# Patient Record
Sex: MALE | Race: WHITE | NOT HISPANIC OR LATINO | Employment: UNEMPLOYED | URBAN - METROPOLITAN AREA
[De-identification: names, ages, dates, MRNs, and addresses within clinical notes are randomized per-mention and may not be internally consistent; named-entity substitution may affect disease eponyms.]

---

## 2021-11-16 ENCOUNTER — HOSPITAL ENCOUNTER (INPATIENT)
Facility: CLINIC | Age: 41
LOS: 8 days | Discharge: HOME | DRG: 885 | End: 2021-11-25
Attending: EMERGENCY MEDICINE | Admitting: PSYCHIATRY & NEUROLOGY
Payer: COMMERCIAL

## 2021-11-16 DIAGNOSIS — F10.21 ALCOHOL DEPENDENCE IN REMISSION (H): ICD-10-CM

## 2021-11-16 DIAGNOSIS — R45.851 SUICIDAL IDEATION: ICD-10-CM

## 2021-11-16 DIAGNOSIS — G47.00 INSOMNIA, UNSPECIFIED TYPE: Primary | ICD-10-CM

## 2021-11-16 DIAGNOSIS — F12.90 MARIJUANA USE: ICD-10-CM

## 2021-11-16 DIAGNOSIS — F31.81 BIPOLAR 2 DISORDER (H): ICD-10-CM

## 2021-11-16 DIAGNOSIS — Z20.822 LAB TEST NEGATIVE FOR COVID-19 VIRUS: ICD-10-CM

## 2021-11-16 DIAGNOSIS — Z87.81 HISTORY OF HIP FRACTURE: ICD-10-CM

## 2021-11-16 LAB
AMPHETAMINES UR QL SCN: ABNORMAL
BARBITURATES UR QL: ABNORMAL
BENZODIAZ UR QL: ABNORMAL
CANNABINOIDS UR QL SCN: ABNORMAL
COCAINE UR QL: ABNORMAL
OPIATES UR QL SCN: ABNORMAL
SARS-COV-2 RNA RESP QL NAA+PROBE: NEGATIVE

## 2021-11-16 PROCEDURE — 80307 DRUG TEST PRSMV CHEM ANLYZR: CPT | Performed by: EMERGENCY MEDICINE

## 2021-11-16 PROCEDURE — 99285 EMERGENCY DEPT VISIT HI MDM: CPT | Mod: 25 | Performed by: EMERGENCY MEDICINE

## 2021-11-16 PROCEDURE — 250N000013 HC RX MED GY IP 250 OP 250 PS 637: Performed by: EMERGENCY MEDICINE

## 2021-11-16 PROCEDURE — 250N000011 HC RX IP 250 OP 636: Performed by: EMERGENCY MEDICINE

## 2021-11-16 PROCEDURE — U0003 INFECTIOUS AGENT DETECTION BY NUCLEIC ACID (DNA OR RNA); SEVERE ACUTE RESPIRATORY SYNDROME CORONAVIRUS 2 (SARS-COV-2) (CORONAVIRUS DISEASE [COVID-19]), AMPLIFIED PROBE TECHNIQUE, MAKING USE OF HIGH THROUGHPUT TECHNOLOGIES AS DESCRIBED BY CMS-2020-01-R: HCPCS | Performed by: EMERGENCY MEDICINE

## 2021-11-16 PROCEDURE — 99284 EMERGENCY DEPT VISIT MOD MDM: CPT | Performed by: EMERGENCY MEDICINE

## 2021-11-16 PROCEDURE — 90791 PSYCH DIAGNOSTIC EVALUATION: CPT

## 2021-11-16 PROCEDURE — C9803 HOPD COVID-19 SPEC COLLECT: HCPCS | Performed by: EMERGENCY MEDICINE

## 2021-11-16 RX ORDER — OXYCODONE HYDROCHLORIDE 5 MG/1
5 TABLET ORAL EVERY 6 HOURS PRN
Status: DISCONTINUED | OUTPATIENT
Start: 2021-11-16 | End: 2021-11-17

## 2021-11-16 RX ORDER — LAMOTRIGINE 25 MG/1
25 TABLET ORAL DAILY
Status: ON HOLD | COMMUNITY
End: 2021-11-24

## 2021-11-16 RX ORDER — ACETAMINOPHEN 500 MG
1000 TABLET ORAL ONCE
Status: COMPLETED | OUTPATIENT
Start: 2021-11-16 | End: 2021-11-16

## 2021-11-16 RX ORDER — LAMOTRIGINE 25 MG/1
25 TABLET ORAL DAILY
Status: DISCONTINUED | OUTPATIENT
Start: 2021-11-17 | End: 2021-11-22

## 2021-11-16 RX ORDER — IBUPROFEN 600 MG/1
600 TABLET, FILM COATED ORAL EVERY 6 HOURS PRN
COMMUNITY
Start: 2021-11-06 | End: 2021-11-16

## 2021-11-16 RX ORDER — MULTIVITAMIN,THER AND MINERALS
1 TABLET ORAL DAILY
COMMUNITY
Start: 2021-11-07 | End: 2021-11-16

## 2021-11-16 RX ORDER — LIDOCAINE 4 G/G
1 PATCH TOPICAL DAILY PRN
COMMUNITY
Start: 2021-11-07 | End: 2021-11-16

## 2021-11-16 RX ORDER — HYDROXYZINE PAMOATE 50 MG/1
50 CAPSULE ORAL
COMMUNITY
Start: 2021-11-06 | End: 2021-11-16

## 2021-11-16 RX ORDER — OXYCODONE HYDROCHLORIDE 5 MG/1
5 TABLET ORAL ONCE
Status: COMPLETED | OUTPATIENT
Start: 2021-11-16 | End: 2021-11-16

## 2021-11-16 RX ORDER — OXYCODONE HYDROCHLORIDE 5 MG/1
5 TABLET ORAL EVERY 6 HOURS PRN
Status: ON HOLD | COMMUNITY
Start: 2021-11-14 | End: 2021-11-24

## 2021-11-16 RX ORDER — POLYETHYLENE GLYCOL 3350 17 G/17G
17 POWDER, FOR SOLUTION ORAL DAILY PRN
COMMUNITY
Start: 2021-11-06 | End: 2021-11-16

## 2021-11-16 RX ORDER — ONDANSETRON 4 MG/1
4 TABLET, ORALLY DISINTEGRATING ORAL ONCE
Status: COMPLETED | OUTPATIENT
Start: 2021-11-16 | End: 2021-11-16

## 2021-11-16 RX ORDER — METHOCARBAMOL 500 MG/1
500-1000 TABLET, FILM COATED ORAL EVERY 6 HOURS PRN
COMMUNITY
Start: 2021-11-06 | End: 2021-11-16

## 2021-11-16 RX ORDER — FOLIC ACID 1 MG/1
1 TABLET ORAL DAILY
COMMUNITY
Start: 2021-11-07 | End: 2021-11-16

## 2021-11-16 RX ADMIN — OXYCODONE HYDROCHLORIDE 5 MG: 5 TABLET ORAL at 16:05

## 2021-11-16 RX ADMIN — OXYCODONE HYDROCHLORIDE 5 MG: 5 TABLET ORAL at 22:53

## 2021-11-16 RX ADMIN — ACETAMINOPHEN 1000 MG: 500 TABLET ORAL at 21:03

## 2021-11-16 RX ADMIN — ONDANSETRON 4 MG: 4 TABLET, ORALLY DISINTEGRATING ORAL at 14:23

## 2021-11-16 ASSESSMENT — ENCOUNTER SYMPTOMS
DYSPHORIC MOOD: 1
HALLUCINATIONS: 0
SLEEP DISTURBANCE: 0
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
## 2021-11-16 NOTE — PHARMACY-ADMISSION MEDICATION HISTORY
Admission medication history interview status for the 11/16/2021 admission is complete. See Epic admission navigator for allergy information, pharmacy, prior to admission medications and immunization status.     Medication history interview sources:  patient interview, dispense history     Changes made to PTA medication list (reason)  Added: oxycodone   Deleted: hydroxyzine, lidocaine patch, methocarbamol, folic acid, multivitamin, Miralax, ibuprofen  Changed: none    Additional medication history information:   -Lamotrigine initiated 4 days ago with plan to increase to 50 mg on 11/23/21 then to 100 mg on 12/7/21       Prior to Admission medications    Medication Sig Last Dose Taking? Auth Provider   lamoTRIgine (LAMICTAL) 25 MG tablet Take 25 mg by mouth daily 11/16/2021 at Unknown time Yes Reported, Patient   oxyCODONE (ROXICODONE) 5 MG tablet Take 5 mg by mouth every 6 hours as needed 11/16/2021 at 0600 Yes Unknown, Entered By History       Medication history completed by:   Gricelda Singh, PharmD, BCPPS  Pediatric Clinical Pharmacist
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
## 2021-11-17 PROBLEM — F31.81 BIPOLAR 2 DISORDER (H): Status: ACTIVE | Noted: 2021-11-17

## 2021-11-17 PROBLEM — F12.90 MARIJUANA USE: Status: ACTIVE | Noted: 2021-11-17

## 2021-11-17 PROBLEM — Z87.81 HISTORY OF HIP FRACTURE: Status: ACTIVE | Noted: 2021-11-17

## 2021-11-17 PROBLEM — F10.21 ALCOHOL DEPENDENCE IN REMISSION (H): Status: ACTIVE | Noted: 2021-11-17

## 2021-11-17 PROCEDURE — 124N000002 HC R&B MH UMMC

## 2021-11-17 PROCEDURE — 90853 GROUP PSYCHOTHERAPY: CPT

## 2021-11-17 PROCEDURE — 99222 1ST HOSP IP/OBS MODERATE 55: CPT | Mod: AI | Performed by: PSYCHIATRY & NEUROLOGY

## 2021-11-17 PROCEDURE — 250N000013 HC RX MED GY IP 250 OP 250 PS 637: Performed by: PSYCHIATRY & NEUROLOGY

## 2021-11-17 PROCEDURE — G0177 OPPS/PHP; TRAIN & EDUC SERV: HCPCS

## 2021-11-17 PROCEDURE — 250N000013 HC RX MED GY IP 250 OP 250 PS 637: Performed by: EMERGENCY MEDICINE

## 2021-11-17 RX ORDER — MAGNESIUM HYDROXIDE/ALUMINUM HYDROXICE/SIMETHICONE 120; 1200; 1200 MG/30ML; MG/30ML; MG/30ML
30 SUSPENSION ORAL EVERY 4 HOURS PRN
Status: DISCONTINUED | OUTPATIENT
Start: 2021-11-17 | End: 2021-11-25 | Stop reason: HOSPADM

## 2021-11-17 RX ORDER — AMOXICILLIN 250 MG
1 CAPSULE ORAL 2 TIMES DAILY PRN
Status: DISCONTINUED | OUTPATIENT
Start: 2021-11-17 | End: 2021-11-25 | Stop reason: HOSPADM

## 2021-11-17 RX ORDER — OLANZAPINE 10 MG/2ML
10 INJECTION, POWDER, FOR SOLUTION INTRAMUSCULAR 3 TIMES DAILY PRN
Status: DISCONTINUED | OUTPATIENT
Start: 2021-11-17 | End: 2021-11-25 | Stop reason: HOSPADM

## 2021-11-17 RX ORDER — OLANZAPINE 10 MG/1
10 TABLET ORAL 3 TIMES DAILY PRN
Status: DISCONTINUED | OUTPATIENT
Start: 2021-11-17 | End: 2021-11-25 | Stop reason: HOSPADM

## 2021-11-17 RX ORDER — ACETAMINOPHEN 325 MG/1
650 TABLET ORAL EVERY 4 HOURS PRN
Status: DISCONTINUED | OUTPATIENT
Start: 2021-11-17 | End: 2021-11-25 | Stop reason: HOSPADM

## 2021-11-17 RX ORDER — HYDROXYZINE HYDROCHLORIDE 25 MG/1
25 TABLET, FILM COATED ORAL EVERY 4 HOURS PRN
Status: DISCONTINUED | OUTPATIENT
Start: 2021-11-17 | End: 2021-11-25 | Stop reason: HOSPADM

## 2021-11-17 RX ORDER — NALOXONE HYDROCHLORIDE 0.4 MG/ML
0.4 INJECTION, SOLUTION INTRAMUSCULAR; INTRAVENOUS; SUBCUTANEOUS
Status: DISCONTINUED | OUTPATIENT
Start: 2021-11-17 | End: 2021-11-25 | Stop reason: HOSPADM

## 2021-11-17 RX ORDER — NALOXONE HYDROCHLORIDE 0.4 MG/ML
0.2 INJECTION, SOLUTION INTRAMUSCULAR; INTRAVENOUS; SUBCUTANEOUS
Status: DISCONTINUED | OUTPATIENT
Start: 2021-11-17 | End: 2021-11-25 | Stop reason: HOSPADM

## 2021-11-17 RX ORDER — OXYCODONE HYDROCHLORIDE 5 MG/1
5-10 TABLET ORAL EVERY 6 HOURS PRN
Status: DISCONTINUED | OUTPATIENT
Start: 2021-11-17 | End: 2021-11-22

## 2021-11-17 RX ORDER — OXYCODONE HYDROCHLORIDE 5 MG/1
5 TABLET ORAL EVERY 6 HOURS PRN
Status: CANCELLED | OUTPATIENT
Start: 2021-11-17

## 2021-11-17 RX ORDER — TRAZODONE HYDROCHLORIDE 50 MG/1
50 TABLET, FILM COATED ORAL
Status: DISCONTINUED | OUTPATIENT
Start: 2021-11-17 | End: 2021-11-25 | Stop reason: HOSPADM

## 2021-11-17 RX ORDER — LAMOTRIGINE 25 MG/1
25 TABLET ORAL DAILY
Status: CANCELLED | OUTPATIENT
Start: 2021-11-17

## 2021-11-17 RX ADMIN — TRAZODONE HYDROCHLORIDE 50 MG: 50 TABLET ORAL at 22:01

## 2021-11-17 RX ADMIN — OXYCODONE HYDROCHLORIDE 5 MG: 5 TABLET ORAL at 09:17

## 2021-11-17 RX ADMIN — OXYCODONE HYDROCHLORIDE 10 MG: 5 TABLET ORAL at 16:08

## 2021-11-17 RX ADMIN — FLUOXETINE 20 MG: 20 CAPSULE ORAL at 12:10

## 2021-11-17 RX ADMIN — ACETAMINOPHEN 650 MG: 325 TABLET, FILM COATED ORAL at 17:34

## 2021-11-17 RX ADMIN — ACETAMINOPHEN 650 MG: 325 TABLET, FILM COATED ORAL at 10:03

## 2021-11-17 RX ADMIN — OXYCODONE HYDROCHLORIDE 10 MG: 5 TABLET ORAL at 22:01

## 2021-11-17 RX ADMIN — LAMOTRIGINE 25 MG: 25 TABLET ORAL at 09:17

## 2021-11-17 ASSESSMENT — ACTIVITIES OF DAILY LIVING (ADL)
DRESS: INDEPENDENT
ORAL_HYGIENE: INDEPENDENT
HYGIENE/GROOMING: INDEPENDENT
LAUNDRY: WITH SUPERVISION
LAUNDRY: WITH SUPERVISION
ORAL_HYGIENE: INDEPENDENT
HYGIENE/GROOMING: INDEPENDENT
HYGIENE/GROOMING: INDEPENDENT
ORAL_HYGIENE: INDEPENDENT

## 2021-11-17 ASSESSMENT — MIFFLIN-ST. JEOR: SCORE: 2063.38
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
## 2021-11-17 NOTE — ED NOTES
ED to Behavioral Floor Handoff    SITUATION  Fran Lowery is a 41 year old male who speaks English and lives in a treatment center. The patient arrived in the ED by private car from treatment center with a complaint of Suicidal (SI with plan to hang self)  .The patient's current symptoms started/worsened 1 and during this time the symptoms have remained the same.   In the ED, pt was diagnosed with   Final diagnoses:   Bipolar 2 disorder (H)   Suicidal ideation   Alcohol dependence in remission (H)        Initial vitals were: BP: 132/87  Pulse: 87  Temp: 97.3  F (36.3  C)  Resp: 16  Weight: 110.2 kg (243 lb)  SpO2: 98 %   --------  Is the patient diabetic? No   If yes, last blood glucose? --     If yes, was this treated in the ED? --  --------  Is the patient inebriated (ETOH) No or Impaired on other substances? No  MSSA done? N/A  Last MSSA score: --    Were withdrawal symptoms treated? N/A  Does the patient have a seizure history? No. If yes, date of most recent seizure--  --------  Is the patient patient experiencing suicidal ideation? pt has thoughts of SI that comes and goes    Homicidal ideation? denies current or recent homicidal ideation or behaviors.    Self-injurious behavior/urges? denies current or recent self injurious behavior or ideation.  ------  Was pt aggressive in the ED No  Was a code called No  Is the pt now cooperative? Yes  -------  Meds given in ED:   Medications   oxyCODONE (ROXICODONE) tablet 5 mg (5 mg Oral Given 11/16/21 1605)   ondansetron (ZOFRAN-ODT) ODT tab 4 mg (4 mg Oral Given 11/16/21 1423)   acetaminophen (TYLENOL) tablet 1,000 mg (1,000 mg Oral Given 11/16/21 2103)      Family present during ED course? No  Family currently present? No    BACKGROUND  Does the patient have a cognitive impairment or developmental disability? No  Allergies: No Known Allergies.   Social demographics are   Social History     Socioeconomic History     Marital status:      Spouse name: None      Number of children: None     Years of education: None     Highest education level: None   Occupational History     None   Tobacco Use     Smoking status: Never Smoker     Smokeless tobacco: Never Used   Substance and Sexual Activity     Alcohol use: Yes     Comment: binge     Drug use: Yes     Types: Marijuana     Sexual activity: Not Currently   Other Topics Concern     None   Social History Narrative     None     Social Determinants of Health     Financial Resource Strain: Not on file   Food Insecurity: Not on file   Transportation Needs: Not on file   Physical Activity: Not on file   Stress: Not on file   Social Connections: Not on file   Intimate Partner Violence: Not on file   Housing Stability: Not on file        ASSESSMENT  Labs results   Labs Ordered and Resulted from Time of ED Arrival to Time of ED Departure   DRUG ABUSE SCREEN 1 URINE (ED) - Abnormal       Result Value    Amphetamines Urine Screen Negative      Barbiturates Urine Screen Negative      Benzodiazepines Urine Screen Negative      Cannabinoids Urine Screen Positive (*)     Cocaine Urine Screen Negative      Opiates Urine Screen Negative     COVID-19 VIRUS (CORONAVIRUS) BY PCR - Normal    SARS CoV2 PCR Negative        Imaging Studies: No results found for this or any previous visit (from the past 24 hour(s)).   Most recent vital signs /87   Pulse 87   Temp 97.3  F (36.3  C) (Oral)   Resp 16   Wt 110.2 kg (243 lb)   SpO2 98%    Abnormal labs/tests/findings requiring intervention:---   Pain control: fair  Nausea control: pt had none    RECOMMENDATION  Are any infection precautions needed (MRSA, VRE, etc.)? No If yes, what infection? --  ---  Does the patient have mobility issues? Pt walks with crutches. If yes, what device does the pt use? ---  ---  Is patient on 72 hour hold or commitment? No If on 72 hour hold, have hold and rights been given to patient? N/A  Are admitting orders written if after 10 p.m. ?N/A  Tasks needing to be  completed:---     Rae Trinh, RN   asc--    [phone] [place] [facility]   [phone] [facility]
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
## 2021-11-17 NOTE — ED NOTES
Pt has calm, pain being managed with Tylenol and Oxycodone. Pt has been using crutches in [facility] and is amenable in using either WC or walker when he is in the unit.  Pt able to contract to safety

## 2021-11-17 NOTE — PROGRESS NOTES
11/17/21 0339   Patient Belongings   Did you bring any home meds/supplements to the hospital?  No   Patient Belongings locker   Patient Belongings Remaining with Patient medical/assistive equipment   Patient Belongings Put in Hospital Secure Location (Security or Locker, etc.) clothing;crutches;MP3 Player;shoes   Belongings Search Yes   Clothing Search Yes   Second Staff Braden LEROY and Cody LEROY RN did the intake search   Comment Anat RICHARD did the belongings search       On patient: hospital walker    Locker: crutches, backpack, travel mug, dental hygiene, hygiene wipes, deck of cards, green folder with stapled paperwork, 2x pants, 2x long sleeve shirts, long sleeve thermal, 3x underwear, pair of shoes, comb, 2x socks, jacket, black zip-up hoodie with strings, [place] ID, stocking hat, and an MP3 player w/2x headphones                A               Admission:  I am responsible for any personal items that are not sent to the safe or pharmacy.  [facility] is not responsible for loss, theft or damage of any property in my possession.    Signature:  _________________________________ Date: _______  Time: _____                                              Staff Signature:  ____________________________ Date: ________  Time: _____      2nd Staff person, if patient is unable/unwilling to sign:    Signature: ________________________________ Date: ________  Time: _____     Discharge:  [facility] has returned all of my personal belongings:    Signature: _________________________________ Date: ________  Time: _____                                          Staff Signature:  ____________________________ Date: ________  Time: _____
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
## 2021-11-17 NOTE — PLAN OF CARE
Pt has a full range affect with a calm mood attending groups. Pt rates anxiety at 0/10 and depression 0/10. Pt received oxycodone for right hip pain with some relief. Pt reports no SI/HI and contracts for safety. Pt denies any hallucinations and not noted responding to any internal stimuli. Pt was medication compliant. No reported or observed medication side effects. Pt was visible on unit and engaged. Continue current POC.

## 2021-11-17 NOTE — PROGRESS NOTES
11/17/21 1759   General Information   Date Initially Attended OT 11/17/21     OT Groups Attended: Clinic     Mood/Affect/General Presentation: Calm/pleasant, engaged, congruent affect.     Patient Response: Pt actively participated in occupational therapy clinic with 5 patients for 45 minutes. Pt was able to ask for assistance as needed, and independently initiate a familiar, single-step, creative expression task with initial assistance for task selection and setup. Pt demonstrated good focus (30 minutes without interruption), planning, and attention to detail during task completion. Pt appeared comfortable interacting with peers and writer, and socialized pleasantly on occasion during his time in clinic.    Plan: More information needed to complete OT Initial Assessment; OT staff will meet with pt to review the role of occupational therapy and explain the value of having them involved in their treatment plan including options to meet current needs/self-identified goals. As group attendance is established, Pt will be given self-assessment to inform OT initial assessment.

## 2021-11-18 LAB
ALBUMIN SERPL-MCNC: 3.5 G/DL (ref 3.4–5)
ALP SERPL-CCNC: 41 U/L (ref 40–150)
ALT SERPL W P-5'-P-CCNC: 39 U/L (ref 0–70)
ANION GAP SERPL CALCULATED.3IONS-SCNC: 4 MMOL/L (ref 3–14)
AST SERPL W P-5'-P-CCNC: 16 U/L (ref 0–45)
BASOPHILS # BLD AUTO: 0 10E3/UL (ref 0–0.2)
BASOPHILS NFR BLD AUTO: 1 %
BILIRUB SERPL-MCNC: 0.7 MG/DL (ref 0.2–1.3)
BUN SERPL-MCNC: 12 MG/DL (ref 7–30)
CALCIUM SERPL-MCNC: 8.8 MG/DL (ref 8.5–10.1)
CHLORIDE BLD-SCNC: 106 MMOL/L (ref 94–109)
CHOLEST SERPL-MCNC: 173 MG/DL
CO2 SERPL-SCNC: 29 MMOL/L (ref 20–32)
CREAT SERPL-MCNC: 1.02 MG/DL (ref 0.66–1.25)
EOSINOPHIL # BLD AUTO: 0.1 10E3/UL (ref 0–0.7)
EOSINOPHIL NFR BLD AUTO: 2 %
ERYTHROCYTE [DISTWIDTH] IN BLOOD BY AUTOMATED COUNT: 12 % (ref 10–15)
GFR SERPL CREATININE-BSD FRML MDRD: >90 ML/MIN/1.73M2
GLUCOSE BLD-MCNC: 92 MG/DL (ref 70–99)
HCT VFR BLD AUTO: 44.4 % (ref 40–53)
HDLC SERPL-MCNC: 39 MG/DL
HGB BLD-MCNC: 15.3 G/DL (ref 13.3–17.7)
IMM GRANULOCYTES # BLD: 0 10E3/UL
IMM GRANULOCYTES NFR BLD: 0 %
LDLC SERPL CALC-MCNC: 117 MG/DL
LYMPHOCYTES # BLD AUTO: 1.9 10E3/UL (ref 0.8–5.3)
LYMPHOCYTES NFR BLD AUTO: 38 %
MCH RBC QN AUTO: 31.3 PG (ref 26.5–33)
MCHC RBC AUTO-ENTMCNC: 34.5 G/DL (ref 31.5–36.5)
MCV RBC AUTO: 91 FL (ref 78–100)
MONOCYTES # BLD AUTO: 0.4 10E3/UL (ref 0–1.3)
MONOCYTES NFR BLD AUTO: 7 %
NEUTROPHILS # BLD AUTO: 2.7 10E3/UL (ref 1.6–8.3)
NEUTROPHILS NFR BLD AUTO: 52 %
NONHDLC SERPL-MCNC: 134 MG/DL
NRBC # BLD AUTO: 0 10E3/UL
NRBC BLD AUTO-RTO: 0 /100
PLATELET # BLD AUTO: 150 10E3/UL (ref 150–450)
POTASSIUM BLD-SCNC: 4 MMOL/L (ref 3.4–5.3)
PROT SERPL-MCNC: 6.6 G/DL (ref 6.8–8.8)
RBC # BLD AUTO: 4.89 10E6/UL (ref 4.4–5.9)
SODIUM SERPL-SCNC: 139 MMOL/L (ref 133–144)
TRIGL SERPL-MCNC: 85 MG/DL
TSH SERPL DL<=0.005 MIU/L-ACNC: 1.16 MU/L (ref 0.4–4)
WBC # BLD AUTO: 5.1 10E3/UL (ref 4–11)

## 2021-11-18 PROCEDURE — 250N000013 HC RX MED GY IP 250 OP 250 PS 637: Performed by: PSYCHIATRY & NEUROLOGY

## 2021-11-18 PROCEDURE — 99232 SBSQ HOSP IP/OBS MODERATE 35: CPT | Performed by: PSYCHIATRY & NEUROLOGY

## 2021-11-18 PROCEDURE — 124N000002 HC R&B MH UMMC

## 2021-11-18 PROCEDURE — 82465 ASSAY BLD/SERUM CHOLESTEROL: CPT | Performed by: PSYCHIATRY & NEUROLOGY

## 2021-11-18 PROCEDURE — 84443 ASSAY THYROID STIM HORMONE: CPT | Performed by: PSYCHIATRY & NEUROLOGY

## 2021-11-18 PROCEDURE — G0177 OPPS/PHP; TRAIN & EDUC SERV: HCPCS

## 2021-11-18 PROCEDURE — 82040 ASSAY OF SERUM ALBUMIN: CPT | Performed by: PSYCHIATRY & NEUROLOGY

## 2021-11-18 PROCEDURE — 85004 AUTOMATED DIFF WBC COUNT: CPT | Performed by: PSYCHIATRY & NEUROLOGY

## 2021-11-18 PROCEDURE — 82247 BILIRUBIN TOTAL: CPT | Performed by: PSYCHIATRY & NEUROLOGY

## 2021-11-18 PROCEDURE — 36415 COLL VENOUS BLD VENIPUNCTURE: CPT | Performed by: PSYCHIATRY & NEUROLOGY

## 2021-11-18 PROCEDURE — 250N000013 HC RX MED GY IP 250 OP 250 PS 637: Performed by: EMERGENCY MEDICINE

## 2021-11-18 RX ADMIN — FLUOXETINE 20 MG: 20 CAPSULE ORAL at 07:51

## 2021-11-18 RX ADMIN — OXYCODONE HYDROCHLORIDE 10 MG: 5 TABLET ORAL at 20:35

## 2021-11-18 RX ADMIN — LAMOTRIGINE 25 MG: 25 TABLET ORAL at 07:51

## 2021-11-18 RX ADMIN — OXYCODONE HYDROCHLORIDE 10 MG: 5 TABLET ORAL at 14:32

## 2021-11-18 RX ADMIN — TRAZODONE HYDROCHLORIDE 50 MG: 50 TABLET ORAL at 21:34

## 2021-11-18 RX ADMIN — ACETAMINOPHEN 650 MG: 325 TABLET, FILM COATED ORAL at 10:08

## 2021-11-18 RX ADMIN — OXYCODONE HYDROCHLORIDE 10 MG: 5 TABLET ORAL at 07:51

## 2021-11-18 ASSESSMENT — ACTIVITIES OF DAILY LIVING (ADL)
DRESS: SCRUBS (BEHAVIORAL HEALTH)
ORAL_HYGIENE: INDEPENDENT
HYGIENE/GROOMING: INDEPENDENT
DRESS: INDEPENDENT
HYGIENE/GROOMING: INDEPENDENT
LAUNDRY: WITH SUPERVISION
ORAL_HYGIENE: INDEPENDENT
LAUNDRY: WITH SUPERVISION

## 2021-11-18 ASSESSMENT — MIFFLIN-ST. JEOR: SCORE: 2040.7
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
## 2021-11-18 NOTE — PLAN OF CARE
Intervention: Pt attended 2 of 3 OT groups: OT Clinic groups with 4 peers. (NO CHARGE GROUP 2)    Patient Response: Pt elected to work on multiple coloring project for duration of attendance. Pt was social with peers and writer throughout duration of game, engaging in conversation about music and music preferences. Pt left second clinic group early to speak with  and returned briefly to group before stating they were done and needed a break.     Duration of attendance: Other: Group 1 - 45 min, Group 2 - 25 min.     Mood/Affect: Pleasant      Plan: Patient encouraged to maintain attendance for continued ongoing support in working towards occupational therapy goals to support overall treatment/care. More information is needed to complete initial OT assessment. As group attendance is established, OT will provide pt with self-assessment form to inform treatment planning/goal setting, and provide explanation of the benefit of participation in occupational therapy services to overall treatment/care during hospitalization.

## 2021-11-18 NOTE — PROGRESS NOTES
"[facility] [place], [place]   Psychiatric Progress Note        Interim History:   The patient's care was discussed with the treatment team during the daily team meeting and/or staff's chart notes were reviewed.  Staff report patient appears to be somewhat more social, talks to peers and goes to groups. He is limping, uses walker to ambulate. Asks for pain med. Was reported to sleep for 6.75 hours.     Met with patient: he was seen in presence of 2 nursing students. Stated that his mood was still so-so, admitted to having Suicidal ideation off and on, though, overall, reports that he has been doing better. Talked to . Was compliant with meds, denied having any side effects. Said that his limping would not prevent him from going to another CD treatment program which would involve using stairs: \"I use crutches, I can manage stairs\". Made it very clear that he would not return to [address] [facility]: \"people use drugs, steal from each other\". He had no further questions or concerns. Patient's lab work from 11/18/2021 was reviewed.          Medications:       FLUoxetine  20 mg Oral Daily     lamoTRIgine  25 mg Oral Daily          Allergies:   No Known Allergies       Labs:     Recent Results (from the past 24 hour(s))   Comprehensive metabolic panel    Collection Time: 11/18/21  7:44 AM   Result Value Ref Range    Sodium 139 133 - 144 mmol/L    Potassium 4.0 3.4 - 5.3 mmol/L    Chloride 106 94 - 109 mmol/L    Carbon Dioxide (CO2) 29 20 - 32 mmol/L    Anion Gap 4 3 - 14 mmol/L    Urea Nitrogen 12 7 - 30 mg/dL    Creatinine 1.02 0.66 - 1.25 mg/dL    Calcium 8.8 8.5 - 10.1 mg/dL    Glucose 92 70 - 99 mg/dL    Alkaline Phosphatase 41 40 - 150 U/L    AST 16 0 - 45 U/L    ALT 39 0 - 70 U/L    Protein Total 6.6 (L) 6.8 - 8.8 g/dL    Albumin 3.5 3.4 - 5.0 g/dL    Bilirubin Total 0.7 0.2 - 1.3 mg/dL    GFR Estimate >90 >60 mL/min/1.73m2   TSH with free T4 reflex and/or T3 as indicated    Collection Time: " "11/18/21  7:44 AM   Result Value Ref Range    TSH 1.16 0.40 - 4.00 mU/L   Lipid panel    Collection Time: 11/18/21  7:44 AM   Result Value Ref Range    Cholesterol 173 <200 mg/dL    Triglycerides 85 <150 mg/dL    Direct Measure HDL 39 (L) >=40 mg/dL    LDL Cholesterol Calculated 117 (H) <=100 mg/dL    Non HDL Cholesterol 134 (H) <130 mg/dL   CBC with platelets and differential    Collection Time: 11/18/21  7:44 AM   Result Value Ref Range    WBC Count 5.1 4.0 - 11.0 10e3/uL    RBC Count 4.89 4.40 - 5.90 10e6/uL    Hemoglobin 15.3 13.3 - 17.7 g/dL    Hematocrit 44.4 40.0 - 53.0 %    MCV 91 78 - 100 fL    MCH 31.3 26.5 - 33.0 pg    MCHC 34.5 31.5 - 36.5 g/dL    RDW 12.0 10.0 - 15.0 %    Platelet Count 150 150 - 450 10e3/uL    % Neutrophils 52 %    % Lymphocytes 38 %    % Monocytes 7 %    % Eosinophils 2 %    % Basophils 1 %    % Immature Granulocytes 0 %    NRBCs per 100 WBC 0 <1 /100    Absolute Neutrophils 2.7 1.6 - 8.3 10e3/uL    Absolute Lymphocytes 1.9 0.8 - 5.3 10e3/uL    Absolute Monocytes 0.4 0.0 - 1.3 10e3/uL    Absolute Eosinophils 0.1 0.0 - 0.7 10e3/uL    Absolute Basophils 0.0 0.0 - 0.2 10e3/uL    Absolute Immature Granulocytes 0.0 <=0.0 10e3/uL    Absolute NRBCs 0.0 10e3/uL          Psychiatric Examination:     /69   Pulse 53   Temp 97.8  F (36.6  C) (Tympanic)   Resp 16   Ht 1.88 m (6' 2\")   Wt 106.6 kg (235 lb)   SpO2 96%   BMI 30.17 kg/m    Weight is 235 lbs 0 oz  Body mass index is 30.17 kg/m .    Orthostatic Vitals  Report      Most Recent      Sitting Orthostatic /84 11/18 0828    Sitting Orthostatic Pulse (bpm) 65 11/18 0828    Standing Orthostatic /85 11/18 0828    Standing Orthostatic Pulse (bpm) 64 11/18 4550          Appearance: awake, alert, dressed in hospital scrubs and appeared as age stated  Attitude:  cooperative  Eye Contact:  fair  Mood:  sad  and better  Affect:  constricted mobility  Speech:  clear, coherent  Psychomotor Behavior:  no evidence of tardive " dyskinesia, dystonia, or tics  Throught Process:  logical and linear  Associations:  no loose associations  Thought Content:  passive suicidal ideation present  Insight:  partial  Judgement:  fair  Oriented to:  time, person, and place  Attention Span and Concentration:  intact  Recent and Remote Memory:  fair    Clinical Global Impressions    First: 6/4 11/18/2021      Most recent:            Precautions:     Behavioral Orders   Procedures     Code 1 - Restrict to Unit     Discontinue 1:1 attendant for suicide risk     Order Specific Question:   I have performed an in person assessment of the patient     Answer:   Based on this assessment the patient no longer requires a one on one attendant at this point in time.     Fall precautions     Routine Programming     As clinically indicated     Status 15     Every 15 minutes.     Suicide precautions     Patients on Suicide Precautions should have a Combination Diet ordered that includes a Diet selection(s) AND a Behavioral Tray selection for Safe Tray - with utensils, or Safe Tray - NO utensils            DIagnoses:     The patient's symptoms are more consistent with a major depressive disorder, recurrent, moderate severity than with bipolar affective disorder.  Alcohol use disorder.  Cannabis use disorder. Antisocial personality traits versus disorder, likely.         Plan:     No medication changes today. Will continue to provide support and structure and work on placement to another CD treatment program. Will order CD eval.

## 2021-11-18 NOTE — PROGRESS NOTES
SPIRITUAL HEALTH SERVICES  SPIRITUAL ASSESSMENT Progress Note  [facility] ([facility] 10N     REFERRAL SOURCE: Pt request     Pt was receptive to meeting with me, and talked about his yumiko journey as well as his strong connection to God.  Pt said that he became born again only a few years ago, and was connected to a community but has to find a new one now that he is getting .  We explored what his new life might look like and what recovery looks like to him, as well as how he wants to continue to work on his mental health before helping others.  I offered prayer at the end of our visit which patient appreciated.    PLAN: [facility] will remain available     Anuradha Abdullahilain  Pager: [phone]

## 2021-11-18 NOTE — PLAN OF CARE
Assessment/Intervention/Current Symtoms and Care Coordination  The patient has been compliant with all care and wants to return to a residential chemical dependency treatment.  He will not return to [facility] and cannot return to [facility] or [facility].  Dr. Jeffrey to put in order for another CD assessment or possible update.  She may only need an update. States last one was completed at [facility] 4-5 days ago.  Patient is on crutches and reports he can get up stairs.    Discharge Plan or Goal  Patient would like to enter residential chemical dependency program.    Barriers to Discharge   Patient needs medication management and stabilization of mood.Patient also needs new or updated CD assessment.    Referral Status  Not as yet.    Legal Status  Voluntary

## 2021-11-18 NOTE — PROGRESS NOTES
"CLINICAL NUTRITION SERVICES - BRIEF NOTE    REASON FOR BRIEF ASSESSMENT  Fran Lowery is a/an 41 year old male briefly assessed by the dietitian for Positive Admission Nutrition Risk Screen (unknown weight loss, decreased appetite)    FINDINGS   Chart review only. Pt admits to facility in the setting of SI, with other noted history of bipolar 2 disorder, alcohol dependence, marijuana abuse, and r/o personality disorder. MAR reviewed. Labs reviewed. Pt is on a regular diet taking meals adequately per nursing notes.     Ht Readings from Last 1 Encounters:   11/17/21 1.88 m (6' 2\")     11/18/21 0828 106.6 kg (235 lb)   11/17/21 0254 108.9 kg (240 lb)   11/16/21 1125 110.2 kg (243 lb)      106.6 kg (235 lb) 11/04/2021 - per CE     BMI Readings from Last 1 Encounters:   11/18/21 30.17 kg/m      Weight assessment: Current weight appears stable from 2 weeks ago, BMI classified as obesity Grade I.     INTERVENTIONS  Implementation  None - risk screen inaccurate/no nutritional concern identified upon chart review, and as long as pt taking meals adequately, RD has no recommendations or suggestions for intervention at this time.     Monitoring/Evaluation  No nutrition follow-up warranted at this time. RD to sign off. Please consult if further needs arise.     Chiquita Hayes RD, CNSC, LD  [facility] RD pager: [phone]        "
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
## 2021-11-18 NOTE — PLAN OF CARE
Patient has been calm and cooperative this shift. He presents with a blunted affect. He his withdrawn to self. He has been isolative to his room but will sometimes hang out in the lounge for a few minutes. Patient ate dinner adequately. Patient denies all mental health symptoms. He has received medication for pain three times during the shift. Vitals are WNL.

## 2021-11-18 NOTE — PLAN OF CARE
Patient was recorded asleep for 6.75hrs. He had no medical or behavioral concerns and remains on 15min checks. RN will continue to monitor.

## 2021-11-19 PROCEDURE — 99231 SBSQ HOSP IP/OBS SF/LOW 25: CPT | Performed by: PSYCHIATRY & NEUROLOGY

## 2021-11-19 PROCEDURE — H0001 ALCOHOL AND/OR DRUG ASSESS: HCPCS

## 2021-11-19 PROCEDURE — 250N000013 HC RX MED GY IP 250 OP 250 PS 637: Performed by: PSYCHIATRY & NEUROLOGY

## 2021-11-19 PROCEDURE — G0177 OPPS/PHP; TRAIN & EDUC SERV: HCPCS

## 2021-11-19 PROCEDURE — 124N000002 HC R&B MH UMMC

## 2021-11-19 PROCEDURE — 90853 GROUP PSYCHOTHERAPY: CPT

## 2021-11-19 PROCEDURE — 250N000013 HC RX MED GY IP 250 OP 250 PS 637: Performed by: EMERGENCY MEDICINE

## 2021-11-19 RX ADMIN — OXYCODONE HYDROCHLORIDE 10 MG: 5 TABLET ORAL at 20:48

## 2021-11-19 RX ADMIN — ACETAMINOPHEN 650 MG: 325 TABLET, FILM COATED ORAL at 15:45

## 2021-11-19 RX ADMIN — LAMOTRIGINE 25 MG: 25 TABLET ORAL at 07:50

## 2021-11-19 RX ADMIN — FLUOXETINE 20 MG: 20 CAPSULE ORAL at 07:50

## 2021-11-19 RX ADMIN — OXYCODONE HYDROCHLORIDE 10 MG: 5 TABLET ORAL at 14:42

## 2021-11-19 RX ADMIN — TRAZODONE HYDROCHLORIDE 50 MG: 50 TABLET ORAL at 21:54

## 2021-11-19 RX ADMIN — OXYCODONE HYDROCHLORIDE 10 MG: 5 TABLET ORAL at 07:50

## 2021-11-19 RX ADMIN — ACETAMINOPHEN 650 MG: 325 TABLET, FILM COATED ORAL at 10:00

## 2021-11-19 ASSESSMENT — ACTIVITIES OF DAILY LIVING (ADL)
LAUNDRY: WITH SUPERVISION
HYGIENE/GROOMING: INDEPENDENT
HYGIENE/GROOMING: INDEPENDENT
ORAL_HYGIENE: INDEPENDENT
DRESS: SCRUBS (BEHAVIORAL HEALTH)
DRESS: SCRUBS (BEHAVIORAL HEALTH);INDEPENDENT
ORAL_HYGIENE: INDEPENDENT

## 2021-11-19 NOTE — PROGRESS NOTES
"[facility], [place]   Psychiatric Progress Note        Interim History:   The patient's care was discussed with the treatment team during the daily team meeting and/or staff's chart notes were reviewed.  Staff report patient appears to be more social, talks to peers and goes to groups. He is limping, uses walker to ambulate. Periodically asks for pain med. Was reported to sleep for 7 hours. Met with CD counselor today, was referred to the following programs:    [facility] Team for Referrals  Phone: [phone]  Fax: [phone]     [facility] - [phone]  Fax - [phone]     [facility]  For Intake Call - [phone]  email - [email or web address]    Met with patient: he was in a better mood, though, reported still having Suicidal ideation as recently as yesterday, but said that today he felt better and was not suicidal. Told me about his visit with CD counselor and confirmed that he was looking forward to going to CD treatment. Agreed with my suggestion to increase his Fluoxetine dose and requested to see an orthopedic service. He had no further questions or concerns.         Medications:       [START ON 11/20/2021] FLUoxetine  30 mg Oral Daily     lamoTRIgine  25 mg Oral Daily          Allergies:   No Known Allergies       Labs:     No results found for this or any previous visit (from the past 24 hour(s)).       Psychiatric Examination:     /81 (BP Location: Left arm)   Pulse 52   Temp 98.1  F (36.7  C) (Oral)   Resp 16   Ht 1.88 m (6' 2\")   Wt 106.6 kg (235 lb)   SpO2 97%   BMI 30.17 kg/m    Weight is 235 lbs 0 oz  Body mass index is 30.17 kg/m .    Orthostatic Vitals  Report      Most Recent      Sitting Orthostatic /81 11/19 0807    Sitting Orthostatic Pulse (bpm) 52 11/19 0807    Standing Orthostatic /74 11/19 0807    Standing Orthostatic Pulse (bpm) 67 11/19 0807         Appearance: awake, alert, dressed in " hospital scrubs and appeared as age stated  Attitude: cooperative  Eye Contact:  fair  Mood: better  Affect: full range  Speech:  clear, coherent  Psychomotor Behavior:  no evidence of tardive dyskinesia, dystonia, or tics  Throught Process:  logical and linear  Associations:  no loose associations  Thought Content:  passive suicidal ideation presents off and on  Insight:  partial  Judgement:  fair  Oriented to:  time, person, and place  Attention Span and Concentration:  intact  Recent and Remote Memory:  fair    Clinical Global Impressions    First: 6/4 11/18/2021      Most recent:            Precautions:     Behavioral Orders   Procedures     Code 1 - Restrict to Unit     Discontinue 1:1 attendant for suicide risk     Order Specific Question:   I have performed an in person assessment of the patient     Answer:   Based on this assessment the patient no longer requires a one on one attendant at this point in time.     Fall precautions     Routine Programming     As clinically indicated     Status 15     Every 15 minutes.     Suicide precautions     Patients on Suicide Precautions should have a Combination Diet ordered that includes a Diet selection(s) AND a Behavioral Tray selection for Safe Tray - with utensils, or Safe Tray - NO utensils            DIagnoses:     The patient's symptoms are more consistent with a major depressive disorder, recurrent, moderate severity than with bipolar affective disorder.  Alcohol use disorder.  Cannabis use disorder. Antisocial personality traits versus disorder, likely.         Plan:     Will increase Fluoxetine. Request orthopedic service consult. Will continue to provide support and structure and work on placement to another CD treatment program.

## 2021-11-19 NOTE — CONSULTS
11/19/2021    CD consult completed.   Pt requesting residential placement. ROIs will be sent to unit [facility] for pt to sign and I will make referrals today.     Recommendations:   1. Abstain from all non-prescribed mood-altering substances  2. Take all medications as prescribed  3. Enter and complete a co-occurring residential or inpatient treatment program  4. Follow all recommendations upon discharge from treatment. Recommendations may include, but are not limited to: extended treatment, outpatient treatment and/or sober housing.  5. Follow all recommendations of your medical and mental health providers    Referrals:  Replaced by [facility] Team for Referrals  Phone: [phone]  Fax: [phone]    [facility] - [phone]  Fax - [phone]    [facility]  For Intake Call - [phone]  email - [email or web address]    Call us:   Tel: [phone]  Fax: [phone]  [email or web address]    CHANDLER consult completed by: Pema Tejada [facility].  Phone Number: [phone]  E-mail Address: [email or web address] Evaluation Department  [address] [place] [address]     *Due to regulation of Title 42 of the Code of Federal Regulations (CFR) Part 2: Confidentiality laws apply to this note and the information wherein.  Thus, this note cannot be copy and pasted into any other health care staff's note nor can it be included in general medical records sent to ANY outside agency without the patient's written consent.

## 2021-11-19 NOTE — PLAN OF CARE
Problem: Additional Goals  Goal: BEH OT Goal 1    Pt attended 2 out of 2 OT groups offered. Pt actively participated in occupational therapy clinic with 4-5 patients total x60 minutes. Pt was able to ask for assistance as needed, and independently return to a creative expression task. Pt demonstrated good focus, planning, and attention to detail. Social with peers and writer, and appropriately used his sense of humor in interactions. Pt actively participated in a structured occupational therapy group of 3-5 patients total x45 minutes with a focus on a visual-spatial leisure task. Pt was able to follow 2-step directions of the novel task, and demonstrated strategic planning and problem solving throughout the task. Pt remained focused and engaged for the full duration of group. Politely encouraged a peer who wanted to join group late. Calm, pleasant, cooperative, and engaged throughout groups. Affect appeared to brighten in interactions.
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
## 2021-11-19 NOTE — PLAN OF CARE
Patient appeared asleep for 7hrs during the night shift. They had no behavioral or medical concerns during the night shift and remains on 15min checks. RN will continue to monitor.

## 2021-11-19 NOTE — PLAN OF CARE
Patient has been calm and cooperative all shift. He spent the start of the shift in his room but came out for dinner. He has been out in the lounge since then watching TV with peers. Patient reported pain of 6/10 and he received oxycodone PRN at about 2035. He denies SI, SIB, HI or hallucination. He did not report any anxiety or depression. Vitals are WNL.

## 2021-11-19 NOTE — PLAN OF CARE
Nursing Assessment    Recent Vitals: B/P: 121/81, T: 98.1, P: 52    Sleep:  Hours of sleep at night: 7    General Shift Summary  Patient has been visible in the milieu, social with peers, interactions are appropriate. He presents with a full range affect. Patient stated that his anxiety and depression have decreased a lot from yesterday. He stated he felt suicidal however no plan for about three hour yesterday and today it's only been for 15 minutes. Hygiene is good. He is eating well.    Patient is medication compliant, denied side effects. He voiced having pain 4/10 in his hip at 0750. He received Oxycodone 10mg at 0750. He voiced having some relief. At 1000 he received Tylenol 650mg for hip pain, patient said it helped with other body aches besides his hip.    Plan is to continue to monitor patient status q 15 mins, assess response to medications, and maintain the patients safety.    Fina Calderon RN MSN

## 2021-11-19 NOTE — PLAN OF CARE
Assessment/Intervention/Current Symtoms and Care Coordination  Patient completed the chemical dependency assessment with Pema ORTIZ.  She referred him to [facility] (which runs [facility])  [facility] in [place] and [facility] on [address] in [facility].  assessments were sent by Pema to all three of these facilities.  Will follow up  With them on Monday after all of them have had time to review the assessment.    Discharge Plan or Goal  [facility]    Barriers to Discharge   Needs further stabilization of his mood and also continued psychiatric assessment.    Referral Status  Done -[facility], [facility] & [facility])    Legal Status   Voluntary

## 2021-11-20 ENCOUNTER — APPOINTMENT (OUTPATIENT)
Dept: GENERAL RADIOLOGY | Facility: CLINIC | Age: 41
DRG: 885 | End: 2021-11-20
Payer: COMMERCIAL

## 2021-11-20 PROCEDURE — 124N000002 HC R&B MH UMMC

## 2021-11-20 PROCEDURE — 72170 X-RAY EXAM OF PELVIS: CPT

## 2021-11-20 PROCEDURE — 73552 X-RAY EXAM OF FEMUR 2/>: CPT | Mod: RT

## 2021-11-20 PROCEDURE — 250N000013 HC RX MED GY IP 250 OP 250 PS 637: Performed by: PSYCHIATRY & NEUROLOGY

## 2021-11-20 PROCEDURE — 250N000013 HC RX MED GY IP 250 OP 250 PS 637: Performed by: EMERGENCY MEDICINE

## 2021-11-20 RX ADMIN — OXYCODONE HYDROCHLORIDE 10 MG: 5 TABLET ORAL at 19:37

## 2021-11-20 RX ADMIN — FLUOXETINE 30 MG: 20 CAPSULE ORAL at 07:35

## 2021-11-20 RX ADMIN — OXYCODONE HYDROCHLORIDE 10 MG: 5 TABLET ORAL at 07:35

## 2021-11-20 RX ADMIN — ACETAMINOPHEN 650 MG: 325 TABLET, FILM COATED ORAL at 09:13

## 2021-11-20 RX ADMIN — ACETAMINOPHEN 650 MG: 325 TABLET, FILM COATED ORAL at 17:03

## 2021-11-20 RX ADMIN — LAMOTRIGINE 25 MG: 25 TABLET ORAL at 07:35

## 2021-11-20 RX ADMIN — OXYCODONE HYDROCHLORIDE 10 MG: 5 TABLET ORAL at 13:35

## 2021-11-20 RX ADMIN — TRAZODONE HYDROCHLORIDE 50 MG: 50 TABLET ORAL at 21:48

## 2021-11-20 ASSESSMENT — ACTIVITIES OF DAILY LIVING (ADL)
ORAL_HYGIENE: INDEPENDENT
DRESS: INDEPENDENT
HYGIENE/GROOMING: INDEPENDENT
LAUNDRY: WITH SUPERVISION
ORAL_HYGIENE: INDEPENDENT
DRESS: SCRUBS (BEHAVIORAL HEALTH)
HYGIENE/GROOMING: INDEPENDENT

## 2021-11-20 NOTE — PLAN OF CARE
"  Problem: Suicidal Behavior  Goal: Suicidal Behavior is Absent or Managed  Outcome: Improving  Flowsheets (Taken 11/19/2021 2119)  Mutually Determined Action Steps (Suicidal Behavior Absent/Managed): other (see comments)  Individualized Action Step (Suicidal Behavior Absent/Managed): Contracts for safety    Patient is alert and oriented x3, calm and cooperative. Patient was present in the milieu and appeared social and interactive with peers. Patient attended and actively participated in group this evening. Patient has a full range affect, normal speech and some insight into his situation. Patient appears neat and well-groomed. Patient voices his needs appropriately. Patient endorsed depression 5/10 and denied anxiety, SI/SIB/HI, racing thoughts,hallucinations, agitations and thoughts of paranoia. Patient is eating, hydrating, and sleeping adequately.     Patient is medication compliant, doesn't need reminders. Medication side effects were not observed or reported this shift.     Vital signs: /81 (BP Location: Left arm)   Pulse 52   Temp 98.1  F (36.7  C) (Oral)   Resp 16   Ht 1.88 m (6' 2\")   Wt 106.6 kg (235 lb)   SpO2 97%   BMI 30.17 kg/m      Patient endorsed constant pain oh his right hip and rated 7/10.    PRN's given this shift: Oxycodone 10 mg and  tylenol 650 mg    Medications refused:None    Plan is to continue to monitor patient status q 15 mins, assess response to medications, and maintain the patients safety.    "

## 2021-11-20 NOTE — PROGRESS NOTES
" 11/19/21 2200   Groups   Details Psychotherapy   Number of patients attending the group:  2-6 persons in 2 groups today  Group Length:  2 Hours-pt attended 1 group hour    Group Therapy Type: Psychotherapy    Summary of Group / Topics Discussed:      The  Psychotherapy group goal is to promote insight to positive choice and change. Group processing is within a supportive and safe environment. Patients will process emotions using verbal group and expressive psychotherapy interventions including visual art/writing interventions.    Group interventions support patients by: emotional regulation and mental health management    Modalities to reach these goals include: DBT (Dialectical Behavioral Therapy)    Subjective -patient report of mood today-\"grateful to be here and be safe.\"    Objective/ Intervention- Goal of group and Therapeutic modality utilized- art and conversation about DBT skills FAST and GIVE    Group Response- engaged    Patient Response-Pt was engaged for 1 group hour. He made a watercolor painting with some spiritual connotation of the rainbow. He said his family isn't intact so it is good to be in a safe place. When things go wrong he feels like withdrawing into himself he reported, but did well in group and with sharing verbally and artistically.    Simeon Rosario, LMFT, ATR-BC            "

## 2021-11-20 NOTE — PLAN OF CARE
Nursing Assessment    Recent Vitals: B/P: 122/80, T: 97.3, P: 60    Sleep:  Hours of sleep at night: 6.75    General Shift Summary  Patient has been visible in the milieu, social with peers, presents with a full range affect, and is calm and cooperative. He voiced having mild depression and anxiety over recent life events and what will happen next. Patient denied SI/HI/AVH/SIB. Patient got an x-ray today of his right hip at 1300, awaiting results. Hygiene is good. He is eating well. Incite and judgment are fair.    Patient is medication compliant with no voiced side effects. He received Oxycodone 10mg at 0735 for hip pain 7/10, and later Tylenol 650mg for generalized aches.    Plan is to continue to monitor patient status q 15 mins, assess response to medications, and maintain the patients safety.    Fina Calderon RN MSN
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
## 2021-11-20 NOTE — PLAN OF CARE
Problem: Sleep Disturbance (Anxiety Signs/Symptoms)  Goal: Improved Sleep (Anxiety Signs/Symptoms)  Outcome: Improving  Flowsheets (Taken 11/20/2021 0630)  Mutually Determined Action Steps (Improved Sleep): sleeps 4-6 hours at night  Patient appeared to be asleep throughout the night and was recorded sleeping for about 7 hours. No problems reported or observed.

## 2021-11-21 PROCEDURE — 250N000013 HC RX MED GY IP 250 OP 250 PS 637: Performed by: PSYCHIATRY & NEUROLOGY

## 2021-11-21 PROCEDURE — 250N000013 HC RX MED GY IP 250 OP 250 PS 637: Performed by: EMERGENCY MEDICINE

## 2021-11-21 PROCEDURE — 124N000002 HC R&B MH UMMC

## 2021-11-21 RX ADMIN — OXYCODONE HYDROCHLORIDE 10 MG: 5 TABLET ORAL at 19:50

## 2021-11-21 RX ADMIN — TRAZODONE HYDROCHLORIDE 50 MG: 50 TABLET ORAL at 22:36

## 2021-11-21 RX ADMIN — LAMOTRIGINE 25 MG: 25 TABLET ORAL at 07:48

## 2021-11-21 RX ADMIN — TRAZODONE HYDROCHLORIDE 50 MG: 50 TABLET ORAL at 21:40

## 2021-11-21 RX ADMIN — FLUOXETINE 30 MG: 20 CAPSULE ORAL at 07:48

## 2021-11-21 RX ADMIN — DOCUSATE SODIUM AND SENNOSIDES 1 TABLET: 8.6; 5 TABLET ORAL at 07:48

## 2021-11-21 RX ADMIN — OXYCODONE HYDROCHLORIDE 10 MG: 5 TABLET ORAL at 13:46

## 2021-11-21 RX ADMIN — OXYCODONE HYDROCHLORIDE 10 MG: 5 TABLET ORAL at 01:39

## 2021-11-21 RX ADMIN — ACETAMINOPHEN 650 MG: 325 TABLET, FILM COATED ORAL at 12:51

## 2021-11-21 RX ADMIN — OXYCODONE HYDROCHLORIDE 10 MG: 5 TABLET ORAL at 07:48

## 2021-11-21 ASSESSMENT — ACTIVITIES OF DAILY LIVING (ADL)
HYGIENE/GROOMING: INDEPENDENT
ORAL_HYGIENE: INDEPENDENT
DRESS: SCRUBS (BEHAVIORAL HEALTH)
DRESS: SCRUBS (BEHAVIORAL HEALTH)
LAUNDRY: WITH SUPERVISION
HYGIENE/GROOMING: INDEPENDENT
DRESS: SCRUBS (BEHAVIORAL HEALTH)
ORAL_HYGIENE: INDEPENDENT
ORAL_HYGIENE: INDEPENDENT
HYGIENE/GROOMING: INDEPENDENT

## 2021-11-21 ASSESSMENT — MIFFLIN-ST. JEOR: SCORE: 2056.58

## 2021-11-21 NOTE — PLAN OF CARE
Problem: Sleep Disturbance (Anxiety Signs/Symptoms)  Goal: Improved Sleep (Anxiety Signs/Symptoms)  Outcome: Improving  Flowsheets (Taken 11/20/2021 0630 by John Vicente RN)  Mutually Determined Action Steps (Improved Sleep): sleeps 4-6 hours at night  Note: Pt was up at around 0140 for pain meds and went back to sleep.      NOC Shift Report    Pt in bed at beginning of shift, breathing quiet and unlabored. Up around 0140 for pain pain meds and went back to sleep. Has been sleeping mostly during the shift. Pt slept 6 hours.     No pt complaints or concerns at this time.     PT was given Oxycodone for right ankle pain and was effective as evidenced by sleeping after mostly during the shift. Will continue to monitor ad update with changes as needed.     Precautions:  Fall

## 2021-11-21 NOTE — PLAN OF CARE
"  Problem: Suicidal Behavior  Goal: Suicidal Behavior is Absent or Managed  Outcome: No Change  Flowsheets  Taken 11/20/2021 2125  Mutually Determined Action Steps (Suicidal Behavior Absent/Managed): shares suicidal thoughts  Taken 11/19/2021 2119  Individualized Action Step (Suicidal Behavior Absent/Managed): Contracts for safety  Note: Brief and intermittent suicidal thoughts    Patient is alert and oriented x3, calm and cooperative. Patient was present in the milieu and appeared social and interactive with peers. Patient attended and  actively participated in group this evening. Patient has a full range affect, normal speech and some insight into their situation. Patient appears neat and well-groomed. Patient voices their needs appropriately. Patient endorses brief suicidal ideations, depression 5/10 and anxiety 2/10. Patient denied thoughts of SIB/HI, racing thoughts,hallucinations, and thoughts of paranoia. Patient is eating, hydrating, and sleeping adequately.     Patient is medication compliant, doesn't need reminders. Medication side effects were not observed or reported this shift.     Vital signs: /84 (BP Location: Right arm)   Pulse 54   Temp 97.4  F (36.3  C) (Oral)   Resp 16   Ht 1.88 m (6' 2\")   Wt 106.6 kg (235 lb)   SpO2 97%   BMI 30.17 kg/m      Patient continues to have pain on his right hip that was managed throughout the shift with tylenol and oxycodone.    PRN's given this shift: Tylenol 650 mg and Oxycodone 10 mg.    Medications refused:None    Plan is to continue to monitor patient status q 15 mins, assess response to medications, and maintain the patients safety.    "

## 2021-11-21 NOTE — PLAN OF CARE
Pt has a full range affect with a calm mood attending groups. Pt rates anxiety at 1/10 and depression 3/10. Pt received oxycodone for right hip pain x's 2 with some relief. Pt is requested oxycodone every 6 hours or sooner. Pt ambulates with walker and gait is steady. Pt reports no SI/HI and contracts for safety. Pt denies any hallucinations and not noted responding to any internal stimuli. Pt was medication compliant. No reported or observed medication side effects. Pt was visible on unit and engaged. Continue current POC.
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
## 2021-11-21 NOTE — PLAN OF CARE
Pt attended some of the structured Therapeutic Recreation group, participating in a group activity. Pt participated in group activity with a focus on gaining self-esteem, managing behaviors, improving social skills, and decreasing isolation.  Pt entered group very late, remained focused and engaged for the final x15 minutes of group.  Pt mood was sociable and was appropriate with interactions, contributing to the clues and descriptions throughout the activity. Pt stated it was a fun group and he wished he joined group earlier.

## 2021-11-22 ENCOUNTER — TELEPHONE (OUTPATIENT)
Dept: ORTHOPEDICS | Facility: CLINIC | Age: 41
End: 2021-11-22
Payer: COMMERCIAL

## 2021-11-22 PROCEDURE — 250N000013 HC RX MED GY IP 250 OP 250 PS 637: Performed by: PSYCHIATRY & NEUROLOGY

## 2021-11-22 PROCEDURE — 124N000002 HC R&B MH UMMC

## 2021-11-22 PROCEDURE — G0177 OPPS/PHP; TRAIN & EDUC SERV: HCPCS

## 2021-11-22 PROCEDURE — 250N000013 HC RX MED GY IP 250 OP 250 PS 637: Performed by: EMERGENCY MEDICINE

## 2021-11-22 PROCEDURE — 99232 SBSQ HOSP IP/OBS MODERATE 35: CPT | Performed by: PSYCHIATRY & NEUROLOGY

## 2021-11-22 PROCEDURE — 90853 GROUP PSYCHOTHERAPY: CPT

## 2021-11-22 RX ORDER — LAMOTRIGINE 25 MG/1
50 TABLET ORAL DAILY
Status: DISCONTINUED | OUTPATIENT
Start: 2021-11-23 | End: 2021-11-25 | Stop reason: HOSPADM

## 2021-11-22 RX ORDER — OXYCODONE HYDROCHLORIDE 5 MG/1
5 TABLET ORAL EVERY 6 HOURS PRN
Status: DISCONTINUED | OUTPATIENT
Start: 2021-11-22 | End: 2021-11-23

## 2021-11-22 RX ADMIN — ACETAMINOPHEN 650 MG: 325 TABLET, FILM COATED ORAL at 09:04

## 2021-11-22 RX ADMIN — TRAZODONE HYDROCHLORIDE 50 MG: 50 TABLET ORAL at 22:45

## 2021-11-22 RX ADMIN — OXYCODONE HYDROCHLORIDE 10 MG: 5 TABLET ORAL at 07:58

## 2021-11-22 RX ADMIN — ACETAMINOPHEN 650 MG: 325 TABLET, FILM COATED ORAL at 17:18

## 2021-11-22 RX ADMIN — FLUOXETINE 30 MG: 20 CAPSULE ORAL at 07:57

## 2021-11-22 RX ADMIN — LAMOTRIGINE 25 MG: 25 TABLET ORAL at 07:58

## 2021-11-22 RX ADMIN — OXYCODONE HYDROCHLORIDE 5 MG: 5 TABLET ORAL at 13:59

## 2021-11-22 RX ADMIN — TRAZODONE HYDROCHLORIDE 50 MG: 50 TABLET ORAL at 21:36

## 2021-11-22 RX ADMIN — OXYCODONE HYDROCHLORIDE 5 MG: 5 TABLET ORAL at 20:21

## 2021-11-22 ASSESSMENT — ACTIVITIES OF DAILY LIVING (ADL)
HYGIENE/GROOMING: INDEPENDENT
ORAL_HYGIENE: INDEPENDENT
HYGIENE/GROOMING: INDEPENDENT
DRESS: SCRUBS (BEHAVIORAL HEALTH)
DRESS: INDEPENDENT
ORAL_HYGIENE: INDEPENDENT

## 2021-11-22 NOTE — PLAN OF CARE
NOC Shift Report     Pt in bed at beginning of shift, breathing quiet and unlabored. Pt slept through shift. Pt slept 6.75 hours.      No pt complaints or concerns at this time.      No PRNs given. Will continue to monitor.    Tristan Robb RN

## 2021-11-22 NOTE — LETTER
[facility] [address]  [address] [facility] [address]  [phone]        November 22, 2021    Regarding:  Fran Lowery  [address] [place] [address] [address] [place] [place] [address]          Trevon Judge,     We are writing to you regarding a follow-up appointment with Dr. Rogers. If you could please call [phone] to get your follow-up scheduled.          Sincerely,        Dr. Rogers's team.
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
## 2021-11-22 NOTE — PLAN OF CARE
"Pt has been active in the milieu. Pt participated in group.  Pt requested oxycodone for hip pain 6/10 with 10 being high with some relief. About an hour after the oxycodone pt requested tylenol for all over body pain at 4/10. Pt later left group and requested PRN oxycodone. Writer informed him it was actually 6 hours to the minute. PT was not interested in meeting with writer and continued to walk toward the door while writer asked questions. Pt rates anxiety 1-2/10 with 10 being high and depression as \"low\" Pt states he had SI thoughts for about an 1/2 hour today with a plan he states he does not want to tell to writer and contracts for safety. Pt wants to go to treatment at discharge. Oxycodone decreased to 5 mg vs 5-10 and prozac and lamical will both be increasing. Pt continues to call the dr by his first name despite writer telling him not to do that. Pt states he is eating and sleeping well with the help of trazodone. Pt ate breakfast and lunch. 6.75 hours recorded last night. Pt uses a walker and has a slow but steady gait. PT order has been placed.  Pt states the dr is not compassionate and pt is very condescending when talking to writer.   "

## 2021-11-22 NOTE — PROGRESS NOTES
Number of patients attending the group:  5  Group Length:  1 Hours    Group Therapy     Summary of Group / Topics Discussed:  Group topic today was distress tolerance skills. Worksheets were provided. Some of the topics included identifying early warning signs, triggers, and learning new DBT skills.     The  Psychotherapy group goal is to promote insight to positive choice and change. Group processing is within a supportive and safe environment. Patients will process emotions using verbal group and expressive psychotherapy interventions.        Assessment /Patient Response- Pt attended full duration of group. He was an active participant and engaged with other group members. Pt was a tad sarcastic at times, WR interpreted this as a means to deflect uncomfortable conversation topics.                      11/22/21 1600   Groups   Details Distress Tolerance Skills

## 2021-11-22 NOTE — PLAN OF CARE
Assessment/Intervention/Current Symtoms and Care Coordination  Patient observed in the lounge with headphones listening to music.  Compliant with care.  He completed CD evaluation on Friday and it is up at [facility] for review. Apparently, they are reviewing because he has a Commercial Plan through [employer] and may have a large co-pay.  They will call me back tomorrow.  His assessment is also over at [facility] in [place].    Discharge Plan or Goal  Patient would like to discharge to an MICD residential program.    Barriers to Discharge   Patient needs medication management and further stabilization.  He is on Oxycontin also and will need to be taken off of that since none of the chemical dependency programs allow it.    Referral Status  Done    Legal Status  Voluntary

## 2021-11-22 NOTE — PROGRESS NOTES
"[facility], [place]   Psychiatric Progress Note        Interim History:   The patient's care was discussed with the treatment team during the daily team meeting and/or staff's chart notes were reviewed.  Staff report that over weekend patient appeared to be more social, talked to peers and went to groups. He is limping, uses walker to ambulate. Periodically asks for pain med and takes max allowed dose. Was referred to the following programs:    [facility] Team for Referrals  Phone: [phone]  Fax: [phone]     [facility] - [phone]  Fax - [phone]     [facility]'s Program  For Intake Call - [phone]  email - [email or web address]    Met with patient: he was in a better mood, but when asked about Suicidal ideation, admitted that he had it as recently as yesterday. Then, said that he had those thoughts without any emotional attachment to them and that he would be very unlikely to follow up on them. Agreed with my suggestion to increase both Prozac and Lamictal doses and meet with PT. I discussed with him that Ortho resident who saw Alfie over weekend suggested not to use narcotics and Alfie agreed with recommendation to start going down on them.          Medications:       [START ON 11/23/2021] FLUoxetine  40 mg Oral Daily     [START ON 11/23/2021] lamoTRIgine  50 mg Oral Daily          Allergies:   No Known Allergies       Labs:     No results found for this or any previous visit (from the past 24 hour(s)).       Psychiatric Examination:     /88 (BP Location: Left arm)   Pulse 58   Temp 98.2  F (36.8  C) (Oral)   Resp 16   Ht 1.88 m (6' 2\")   Wt 108.2 kg (238 lb 8 oz)   SpO2 97%   BMI 30.62 kg/m    Weight is 238 lbs 8 oz  Body mass index is 30.62 kg/m .    Orthostatic Vitals  Report      Most Recent      Sitting Orthostatic /88 11/22 0753    Sitting Orthostatic Pulse (bpm) 58 11/22 0753    Standing Orthostatic /78 " 11/22 0753    Standing Orthostatic Pulse (bpm) 75 11/22 0753         Appearance: awake, alert, dressed in hospital scrubs and appeared as age stated  Attitude: overall, cooperative  Eye Contact:  fair  Mood: better  Affect: full range  Speech:  clear, coherent  Psychomotor Behavior:  no evidence of tardive dyskinesia, dystonia, or tics  Throught Process:  logical and linear  Associations:  no loose associations  Thought Content:  passive suicidal ideation presents off and on  Insight:  partial  Judgement:  fair  Oriented to:  time, person, and place  Attention Span and Concentration:  intact  Recent and Remote Memory:  fair    Clinical Global Impressions    First: 6/4 11/18/2021      Most recent:            Precautions:     Behavioral Orders   Procedures     Code 2     Discontinue 1:1 attendant for suicide risk     Order Specific Question:   I have performed an in person assessment of the patient     Answer:   Based on this assessment the patient no longer requires a one on one attendant at this point in time.     Fall precautions     Routine Programming     As clinically indicated     Status 15     Every 15 minutes.     Suicide precautions     Patients on Suicide Precautions should have a Combination Diet ordered that includes a Diet selection(s) AND a Behavioral Tray selection for Safe Tray - with utensils, or Safe Tray - NO utensils            DIagnoses:     The patient's symptoms are more consistent with a major depressive disorder, recurrent, moderate severity than with bipolar affective disorder.  Alcohol use disorder.  Cannabis use disorder. Antisocial personality traits versus disorder, likely.         Plan:     Will increase Fluoxetine and Lamictal. Will start tapering off Oxycodone. Request PT consult. Will continue to provide support and structure and work on placement to CD treatment program.
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
## 2021-11-23 ENCOUNTER — APPOINTMENT (OUTPATIENT)
Dept: PHYSICAL THERAPY | Facility: CLINIC | Age: 41
DRG: 885 | End: 2021-11-23
Attending: PSYCHIATRY & NEUROLOGY
Payer: COMMERCIAL

## 2021-11-23 LAB — SARS-COV-2 RNA RESP QL NAA+PROBE: NEGATIVE

## 2021-11-23 PROCEDURE — U0003 INFECTIOUS AGENT DETECTION BY NUCLEIC ACID (DNA OR RNA); SEVERE ACUTE RESPIRATORY SYNDROME CORONAVIRUS 2 (SARS-COV-2) (CORONAVIRUS DISEASE [COVID-19]), AMPLIFIED PROBE TECHNIQUE, MAKING USE OF HIGH THROUGHPUT TECHNOLOGIES AS DESCRIBED BY CMS-2020-01-R: HCPCS | Performed by: PSYCHIATRY & NEUROLOGY

## 2021-11-23 PROCEDURE — 99232 SBSQ HOSP IP/OBS MODERATE 35: CPT | Performed by: PSYCHIATRY & NEUROLOGY

## 2021-11-23 PROCEDURE — 250N000013 HC RX MED GY IP 250 OP 250 PS 637: Performed by: PSYCHIATRY & NEUROLOGY

## 2021-11-23 PROCEDURE — 97161 PT EVAL LOW COMPLEX 20 MIN: CPT | Mod: GP | Performed by: PHYSICAL THERAPIST

## 2021-11-23 PROCEDURE — 97116 GAIT TRAINING THERAPY: CPT | Mod: GP | Performed by: PHYSICAL THERAPIST

## 2021-11-23 PROCEDURE — 124N000002 HC R&B MH UMMC

## 2021-11-23 PROCEDURE — 97110 THERAPEUTIC EXERCISES: CPT | Mod: GP | Performed by: PHYSICAL THERAPIST

## 2021-11-23 RX ORDER — OXYCODONE HYDROCHLORIDE 5 MG/1
5 TABLET ORAL 2 TIMES DAILY PRN
Status: DISCONTINUED | OUTPATIENT
Start: 2021-11-23 | End: 2021-11-24

## 2021-11-23 RX ADMIN — OXYCODONE HYDROCHLORIDE 5 MG: 5 TABLET ORAL at 14:20

## 2021-11-23 RX ADMIN — FLUOXETINE 40 MG: 20 CAPSULE ORAL at 08:16

## 2021-11-23 RX ADMIN — TRAZODONE HYDROCHLORIDE 50 MG: 50 TABLET ORAL at 22:04

## 2021-11-23 RX ADMIN — ACETAMINOPHEN 650 MG: 325 TABLET, FILM COATED ORAL at 17:40

## 2021-11-23 RX ADMIN — ACETAMINOPHEN 650 MG: 325 TABLET, FILM COATED ORAL at 08:58

## 2021-11-23 RX ADMIN — OXYCODONE HYDROCHLORIDE 5 MG: 5 TABLET ORAL at 08:16

## 2021-11-23 RX ADMIN — LAMOTRIGINE 50 MG: 25 TABLET ORAL at 08:16

## 2021-11-23 RX ADMIN — TRAZODONE HYDROCHLORIDE 50 MG: 50 TABLET ORAL at 21:18

## 2021-11-23 ASSESSMENT — ACTIVITIES OF DAILY LIVING (ADL)
LAUNDRY: WITH SUPERVISION
DRESS: INDEPENDENT
ORAL_HYGIENE: INDEPENDENT
DRESS: INDEPENDENT
DRESS: SCRUBS (BEHAVIORAL HEALTH)
HYGIENE/GROOMING: INDEPENDENT
ORAL_HYGIENE: INDEPENDENT
HYGIENE/GROOMING: INDEPENDENT
ORAL_HYGIENE: INDEPENDENT
LAUNDRY: WITH SUPERVISION
HYGIENE/GROOMING: INDEPENDENT

## 2021-11-23 ASSESSMENT — MIFFLIN-ST. JEOR: SCORE: 2057.49

## 2021-11-23 NOTE — PLAN OF CARE
"  Problem: Suicidal Behavior  Goal: Suicidal Behavior is Absent or Managed  Outcome: No Change  Flowsheets (Taken 11/19/2021 2119)  Individualized Action Step (Suicidal Behavior Absent/Managed): Contracts for safety    Patient is alert and oriented x3, calm and cooperative. Patient was present in the milieu and appeared social and interactive with peers. Patient attended and  actively participated in group this evening. Patient has a full range affect, normal speech and some insight into their situation. Patient appears neat and well-groomed. Patient voices their needs appropriately. Patient endorses depression 2/10 and anxiety 4/10. Patient denied having  SI/SIB/HI, racing thoughts,hallucinations, and thoughts of paranoia. Patient stated that Patient is eating, hydrating, and sleeping adequately.     Patient is medication compliant, doesn't need reminders. Medication side effects were not observed or reported this shift.     Vital signs: /80 (BP Location: Left arm, Patient Position: Sitting)   Pulse 53   Temp 97.6  F (36.4  C) (Oral)   Resp 16   Ht 1.88 m (6' 2\")   Wt 108.2 kg (238 lb 8 oz)   SpO2 97%   BMI 30.62 kg/m        Patient denied pain.    PRN's given this shift: Oxycodone, Tylenol 650 mg , trazodone 50 mg x2     Medications refused:None    Plan is to continue to monitor patient status q 15 mins, assess response to medications, and maintain the patients safety.    "
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
## 2021-11-23 NOTE — PROGRESS NOTES
"[facility], [place]   Psychiatric Progress Note        Interim History:   The patient's care was discussed with the treatment team during the daily team meeting and/or staff's chart notes were reviewed.  Staff report that patient appearsto be more social, talks to peers and goes to groups. He can be demanding. He is limping, uses walker to ambulate. Periodically asks for pain med and takes max allowed dose. Met with PT. Was referred to the following programs:    [facility] Team for Referrals  Phone: [phone]  Fax: [phone]     Critical access hospital - [phone]  Fax - [phone]     [facility]  For Intake Call - [phone]  email - [email or web address]    Met with patient: he was in a better mood, though, again said that he had pretty bad mood yesterday in the evening, but could not explain a reason for that. Stated that he was accepted to a program and would go there on Friday, but would like to be discharged on Thursday morning to spend Thanksgiving with his family. Denied Suicidal ideation. We discussed that we would continue to cut down on his Oxycodone, he was OK with that.         Medications:       FLUoxetine  40 mg Oral Daily     lamoTRIgine  50 mg Oral Daily          Allergies:   No Known Allergies       Labs:     No results found for this or any previous visit (from the past 24 hour(s)).       Psychiatric Examination:     /76 (BP Location: Left arm, Patient Position: Chair)   Pulse 64   Temp 98.2  F (36.8  C) (Oral)   Resp 16   Ht 1.88 m (6' 2\")   Wt 108.3 kg (238 lb 11.2 oz)   SpO2 97%   BMI 30.65 kg/m    Weight is 238 lbs 11.2 oz  Body mass index is 30.65 kg/m .    Orthostatic Vitals  Report      Most Recent      Sitting Orthostatic /76 11/23 0749    Sitting Orthostatic Pulse (bpm) 64 11/23 0749    Standing Orthostatic /76 11/23 0749    Standing Orthostatic Pulse (bpm) 65 11/23 0749         Appearance: awake, " alert, dressed in hospital scrubs and appeared as age stated  Attitude: overall, cooperative  Eye Contact:  fair  Mood: better, less depressed  Affect: full range  Speech:  clear, coherent  Psychomotor Behavior:  no evidence of tardive dyskinesia, dystonia, or tics  Throught Process:  logical and linear  Associations:  no loose associations  Thought Content:  passive suicidal ideation presents off and on, less frequent than before  Insight:  partial  Judgement:  fair  Oriented to:  time, person, and place  Attention Span and Concentration:  intact  Recent and Remote Memory:  fair    Clinical Global Impressions    First: 6/4 11/18/2021      Most recent:            Precautions:     Behavioral Orders   Procedures     Code 2     Discontinue 1:1 attendant for suicide risk     Order Specific Question:   I have performed an in person assessment of the patient     Answer:   Based on this assessment the patient no longer requires a one on one attendant at this point in time.     Fall precautions     Routine Programming     As clinically indicated     Status 15     Every 15 minutes.     Suicide precautions     Patients on Suicide Precautions should have a Combination Diet ordered that includes a Diet selection(s) AND a Behavioral Tray selection for Safe Tray - with utensils, or Safe Tray - NO utensils            DIagnoses:     The patient's symptoms are more consistent with a major depressive disorder, recurrent, moderate severity than with bipolar affective disorder.  Alcohol use disorder.  Cannabis use disorder. Antisocial personality traits versus disorder, likely.         Plan:     Will continue tapering off Oxycodone. Will continue to provide support and structure and work on placement to CD treatment program. Will plan on discharging home this Thursday with plan to start attending CD treatment on Friday.
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
## 2021-11-23 NOTE — PLAN OF CARE
Pt has been very active in the milieu. Pt is very social with peers. Pt was seen by PT and stated it went well. Pt denies any questions or concerns at this time. Rates depression 1-2/10 with 10 being high and denies anxiety. Pt denies SI and SIB. Pt plans to go to treatment on Friday but would like to [facility] on Thursday [facility]. Pt states he would not be able to drink alcohol at her house due to her not allowing it. Pt requested PRN oxycodone for hip pain with some relief and then requested tylenol for pain about an hour after again with some relief. Pt continues to call the dr by his first name.  Pt uses a walker without assistance.

## 2021-11-23 NOTE — PLAN OF CARE
Assessment/Intervention/Current Symtoms and Care Coordination  Patient social in the lounge.  Some reports of negative behaviors toward other staff.  Patient spoke with Artemio at [facility] and it was decided they would try to get the patient into [facility] ([place] [place] at [place] but patient records were faxed first to [facility] for review. If all is okay, then [facility] will pass it on to [facility] ([place] [place] who will then set up staff to call and interview patient.  Patient still on Oxycontin and needs to have it tapered so RN    [facility] sent paperwork on to [facility] who will interview him and if accepted, they will then need to send the CD paperwork to [facility] for authorization.  Faxed the CD Assessment to [facility] as instructed by Simeon ([phone]) at [facility].     Discharge Plan or Goal  Patient awaiting word about MICD residential through [facility].    Barriers to Discharge   Patient needs medication management and to be tapered     Referral Status  Referred to [facility]    Legal Status  Voluntary

## 2021-11-23 NOTE — PROGRESS NOTES
Pt reported hip, and leg pain level 4/10, and requested to PRN medication. Calm and cooperative, took all the schedule medication

## 2021-11-24 ENCOUNTER — APPOINTMENT (OUTPATIENT)
Dept: PHYSICAL THERAPY | Facility: CLINIC | Age: 41
DRG: 885 | End: 2021-11-24
Payer: COMMERCIAL

## 2021-11-24 PROCEDURE — 97116 GAIT TRAINING THERAPY: CPT | Mod: GP | Performed by: PHYSICAL THERAPIST

## 2021-11-24 PROCEDURE — H2032 ACTIVITY THERAPY, PER 15 MIN: HCPCS

## 2021-11-24 PROCEDURE — 124N000002 HC R&B MH UMMC

## 2021-11-24 PROCEDURE — 250N000013 HC RX MED GY IP 250 OP 250 PS 637: Performed by: PSYCHIATRY & NEUROLOGY

## 2021-11-24 PROCEDURE — 99232 SBSQ HOSP IP/OBS MODERATE 35: CPT | Performed by: PSYCHIATRY & NEUROLOGY

## 2021-11-24 PROCEDURE — 97110 THERAPEUTIC EXERCISES: CPT | Mod: GP | Performed by: PHYSICAL THERAPIST

## 2021-11-24 RX ORDER — FLUOXETINE 40 MG/1
40 CAPSULE ORAL DAILY
Qty: 30 CAPSULE | Refills: 1 | Status: SHIPPED | OUTPATIENT
Start: 2021-11-25 | End: 2022-11-14

## 2021-11-24 RX ORDER — OXYCODONE HYDROCHLORIDE 5 MG/1
5 TABLET ORAL DAILY PRN
Status: COMPLETED | OUTPATIENT
Start: 2021-11-24 | End: 2021-11-24

## 2021-11-24 RX ORDER — TRAZODONE HYDROCHLORIDE 50 MG/1
50 TABLET, FILM COATED ORAL
Qty: 30 TABLET | Refills: 1 | Status: SHIPPED | OUTPATIENT
Start: 2021-11-24 | End: 2024-10-01

## 2021-11-24 RX ORDER — LAMOTRIGINE 25 MG/1
50 TABLET ORAL DAILY
Qty: 60 TABLET | Refills: 1 | Status: SHIPPED | OUTPATIENT
Start: 2021-11-25 | End: 2022-11-14

## 2021-11-24 RX ADMIN — ACETAMINOPHEN 650 MG: 325 TABLET, FILM COATED ORAL at 06:56

## 2021-11-24 RX ADMIN — TRAZODONE HYDROCHLORIDE 50 MG: 50 TABLET ORAL at 21:37

## 2021-11-24 RX ADMIN — LAMOTRIGINE 50 MG: 25 TABLET ORAL at 08:32

## 2021-11-24 RX ADMIN — OXYCODONE HYDROCHLORIDE 5 MG: 5 TABLET ORAL at 21:37

## 2021-11-24 RX ADMIN — FLUOXETINE 40 MG: 20 CAPSULE ORAL at 08:31

## 2021-11-24 RX ADMIN — OXYCODONE HYDROCHLORIDE 5 MG: 5 TABLET ORAL at 08:32

## 2021-11-24 RX ADMIN — OXYCODONE HYDROCHLORIDE 5 MG: 5 TABLET ORAL at 15:12

## 2021-11-24 RX ADMIN — ACETAMINOPHEN 650 MG: 325 TABLET, FILM COATED ORAL at 16:37

## 2021-11-24 ASSESSMENT — ACTIVITIES OF DAILY LIVING (ADL)
ORAL_HYGIENE: INDEPENDENT
LAUNDRY: UNABLE TO COMPLETE
DRESS: INDEPENDENT
DRESS: INDEPENDENT
HYGIENE/GROOMING: INDEPENDENT
ORAL_HYGIENE: INDEPENDENT
LAUNDRY: WITH SUPERVISION
HYGIENE/GROOMING: INDEPENDENT

## 2021-11-24 NOTE — PLAN OF CARE
Physical Therapy Discharge Summary    Reason for therapy discharge:    All goals and outcomes met, no further needs identified.    Progress towards therapy goal(s). See goals on Care Plan in [facility] electronic health record for goal details.  Goals met    Therapy recommendation(s):    Continued therapy is recommended.  Rationale/Recommendations:  continues to require skilled physical therapy to progress LE strength and gait mechanics..

## 2021-11-24 NOTE — PROGRESS NOTES
"Pt participated in dance/movement therapy (D/MT) initially exploring boundaries and \"first layer\" needs, then addressing needs through somatic listening and finding deeper layers of needs to address.  Group process moved through individual needs to increasing shared-needs and eventually, a group light and quick rhythmic snap that felt like \"happiness.\"  Pt was a leader in role-modeling \"listening\" to pain originating in hips.  He shared some of his historic reasons for not being able to be attentive or expressive of his own needs.       11/24/21 2252   Dance Movement Therapy   Type of Intervention structured groups   Response participates, initiates socially appropriate   Hours 1     "

## 2021-11-24 NOTE — PLAN OF CARE
NOC Shift Report     Pt in bed at beginning of shift, breathing quiet and unlabored. Pt slept through shift. Pt slept 5 hours.      No pt complaints or concerns at this time.      Acetaminophen 650 mg PO given PRN for extremity pain. Will continue to monitor.    Tristan Robb RN

## 2021-11-24 NOTE — PLAN OF CARE
Patient has been calm this shift. He is social in the milieu. He did not attend groups. He watched movies all shift. Patient requested tylenol PRN for pain and is aware that his oxycodone dose has been tapered down. He seems receptive to the changes. Patient denies SI, SIB, HI or hallucinations. He did not report any anxiety this shift. Patient has been in a bright mood. Covid test was done and sent to Lab. RN will continue to monitor.

## 2021-11-24 NOTE — PLAN OF CARE
Problem: Adult Inpatient Plan of Care  Goal: Plan of Care Review  Outcome: Improving  Flowsheets (Taken 11/24/2021 1700)  Plan of Care Reviewed With: patient     Problem: Activity and Energy Impairment (Anxiety Signs/Symptoms)  Goal: Optimized Energy Level (Anxiety Signs/Symptoms)  Outcome: Improving  Intervention: Optimize Energy Level  Recent Flowsheet Documentation  Taken 11/24/2021 1700 by Katerine Kaplan RN  Diversional Activity: television  Activity (Behavioral Health): activity encouraged     Problem: Depression  Goal: Improved Mood  Outcome: Improving    Patient is pleasant, calm, cooperative, visible on the unit. Patient is looking to discharge tomorrow.    Depression 2/10, denies anxiety and suicidal ideation or any other psych symptoms.     Reports bilateral knee pain 4/10, acetaminophen and oxycodone prn given for pain.    Staff will continue to monitor closely.

## 2021-11-24 NOTE — DISCHARGE INSTRUCTIONS
Behavioral Discharge Planning and Instructions    Summary: You were admitted on 11/16/2021  due to suicidal ideation and drug abuse..  You were treated by Dr. Blue Prince and discharged on 11/25 from [facility]  to     Main Diagnosis:  Major Depressive Disorder, Recurrent, Severe  Substance Abuse Disorder      Health Care Follow-up:   [facility] [place] - Intake/Start Date on Friday, November 26th at Noon  *Remember to lian medications with you!  [address] [place]  [address]  [phone]      [facility] Care Manager is Leilani at [phone] if you have any questions about what is covered under your health plan or  who is in your healthcare network.        Attend all scheduled appointments with your outpatient providers. Call at least 24 hours in advance if you need to reschedule an appointment to ensure continued access to your outpatient providers.     Major Treatments, Procedures and Findings:  You were provided with: a psychiatric assessment, assessed for medical stability, medication evaluation and/or management, group therapy, CD evaluation/assessment and milieu management    Symptoms to Report: mood getting worse or thoughts of suicide    Early warning signs can include: increased depression or anxiety sleep disturbances increased thoughts or behaviors of suicide or self-harm     Safety and Wellness:  Take all medicines as directed.  Make no changes unless your doctor suggests them.      Follow treatment recommendations.  Refrain from alcohol and non-prescribed drugs.  If there is a concern for safety, call 911.    Resources:   Crisis Intervention: [phone] or [phone] (TTY: [phone]).  Call anytime for help.    General Medication Instructions:   See your medication sheet(s) for instructions.   Take all medicines as directed.  Make no changes unless your doctor suggests them.   Go to all your doctor visits.  Be sure to have all your required lab tests. This way, your  medicines can be refilled on time.  Do not use any drugs not prescribed by your doctor.  Avoid alcohol.    Advance Directives:   Scanned document on file with [facility]? No scanned doc  Is document scanned? Pt states no documents  Honoring Choices Your Rights Handout: Informed and given  Was more information offered? Pt declined    The Treatment team has appreciated the opportunity to work with you. If you have any questions or concerns about your recent admission, you can contact the unit which can receive your call 24 hours a day, 7 days a week. They will be able to get in touch with a Provider if needed. The unit number is [phone] .

## 2021-11-24 NOTE — PLAN OF CARE
Nursing Assessment    Recent Vitals: B/P: 127/76, T: 98.1, P: 54    Sleep:  Hours of sleep at night: 5    General Shift Summary  Patient has been in milieu, social with peers, calm and cooperative. He denied SI/HI/AVH/SIB, voiced having very mild anxiety and denied depression. Hygiene is good. He is eating well. Patient was medication compliant, denied side effects. Reicieved Oxy 5mg at 0835 for left hip pain 4/10. About an hour later he stated it was between a 2 and 3 resting.    Plan is to continue to monitor patient status q 15 mins, assess response to medications, and maintain the patients safety.    Fina Calderon, RN MSN

## 2021-11-24 NOTE — PLAN OF CARE
Assessment/Intervention/Current Symtoms and Care Coordination  Patient compliant with all care.  Attends programming. VIsible in the lounge and contracts for safety.  He has been accepted to enter [facility] [place] on Friday at Noon.     Discharge Plan or Goal  Patient will enter MICD treatment for 28 days.    Barriers to Discharge   Needs further stabilization and medication management    Referral Status  Done    Legal Status  Voluntary
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
## 2021-11-25 VITALS
BODY MASS INDEX: 30.54 KG/M2 | HEIGHT: 74 IN | WEIGHT: 238 LBS | RESPIRATION RATE: 16 BRPM | OXYGEN SATURATION: 98 % | SYSTOLIC BLOOD PRESSURE: 157 MMHG | HEART RATE: 59 BPM | TEMPERATURE: 97.5 F | DIASTOLIC BLOOD PRESSURE: 88 MMHG

## 2021-11-25 PROCEDURE — 250N000013 HC RX MED GY IP 250 OP 250 PS 637: Performed by: PSYCHIATRY & NEUROLOGY

## 2021-11-25 PROCEDURE — 99239 HOSP IP/OBS DSCHRG MGMT >30: CPT | Performed by: PSYCHIATRY & NEUROLOGY

## 2021-11-25 RX ADMIN — LAMOTRIGINE 50 MG: 25 TABLET ORAL at 07:45

## 2021-11-25 RX ADMIN — FLUOXETINE 40 MG: 20 CAPSULE ORAL at 07:45

## 2021-11-25 ASSESSMENT — MIFFLIN-ST. JEOR: SCORE: 2054.31

## 2021-11-25 NOTE — PLAN OF CARE
48 Hours Nursing Assessment/Discharge note  Shift Summary: Pt alert and oriented x 3. Presents polite, cooperative and calm. Able to communicate needs. Speech is clear and coherent  Affect is appropriate and mood is calm. Poor concentrations. Insight and judgement are improving. Hopeful for future. Visible in milieu and social with other patient. Appetite adequate. Pt is medication compliance. Slept hours last night.     Discharged today. All discharge medications and instructions were reviewed with pt. Copy of discharge instruction and unit address/phone number given to pt. Walking, escorted down stairs and transferred to car safely.     At the time of discharge, pt denied any SI, SIB, HI, hallucination, racing thought, suicidal or homicidal ideations. No evidence of psychosis or paranoid/delusional thoughts. Endorses both depression and anxiety. Rated depression 5/10 & anxiety 3/10. Pt denies access to guns. Denies feeling hopeless or helpless. Pt is determined to not be an immediate danger to herself/himself or others.   Discharge medication: given to pt from [facility] except for Lamictal which is too soon to be fill per pharmacy.  Discharge palace: home  Transportation: mother came to  pt  Outcome: Progressing well   New Medications Today: None  Prn Medication given/Reason/effectiveness: None  Pain: Denies   Sensitivity/side effect: Tolerating medications well. No side effect reported by pt or noted by this writer.  Valuable: none

## 2021-11-25 NOTE — PLAN OF CARE
NOC Shift Report     Pt in bed at beginning of shift, breathing quiet and unlabored. Pt slept through shift. Pt slept 6 hours.      No pt complaints or concerns at this time.      No PRNs given. Will continue to monitor.    Tristan Robb RN
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
## 2021-12-30 DIAGNOSIS — M25.551 RIGHT HIP PAIN: Primary | ICD-10-CM

## 2022-03-14 ENCOUNTER — APPOINTMENT (OUTPATIENT)
Dept: GENERAL RADIOLOGY | Facility: CLINIC | Age: 42
End: 2022-03-14
Attending: EMERGENCY MEDICINE
Payer: COMMERCIAL

## 2022-03-14 ENCOUNTER — HOSPITAL ENCOUNTER (EMERGENCY)
Facility: CLINIC | Age: 42
Discharge: HOME | End: 2022-03-14
Attending: EMERGENCY MEDICINE | Admitting: EMERGENCY MEDICINE
Payer: COMMERCIAL

## 2022-03-14 VITALS
RESPIRATION RATE: 14 BRPM | WEIGHT: 257.3 LBS | TEMPERATURE: 97.8 F | OXYGEN SATURATION: 97 % | DIASTOLIC BLOOD PRESSURE: 98 MMHG | BODY MASS INDEX: 33.04 KG/M2 | SYSTOLIC BLOOD PRESSURE: 151 MMHG | HEART RATE: 103 BPM

## 2022-03-14 DIAGNOSIS — Z78.9 ALCOHOL USE: ICD-10-CM

## 2022-03-14 DIAGNOSIS — R07.9 CHEST PAIN, UNSPECIFIED TYPE: ICD-10-CM

## 2022-03-14 DIAGNOSIS — T14.8XXA ABRASION OF SKIN: ICD-10-CM

## 2022-03-14 DIAGNOSIS — F32.A DEPRESSION, UNSPECIFIED DEPRESSION TYPE: ICD-10-CM

## 2022-03-14 LAB
ALBUMIN SERPL-MCNC: 4.1 G/DL (ref 3.4–5)
ALCOHOL BREATH TEST: 0.21 (ref 0–0.01)
ALP SERPL-CCNC: 44 U/L (ref 40–150)
ALT SERPL W P-5'-P-CCNC: 34 U/L (ref 0–70)
ANION GAP SERPL CALCULATED.3IONS-SCNC: 9 MMOL/L (ref 3–14)
AST SERPL W P-5'-P-CCNC: 39 U/L (ref 0–45)
BASOPHILS # BLD AUTO: 0 10E3/UL (ref 0–0.2)
BASOPHILS NFR BLD AUTO: 0 %
BILIRUB SERPL-MCNC: 1.4 MG/DL (ref 0.2–1.3)
BUN SERPL-MCNC: 13 MG/DL (ref 7–30)
CALCIUM SERPL-MCNC: 9.1 MG/DL (ref 8.5–10.1)
CHLORIDE BLD-SCNC: 108 MMOL/L (ref 94–109)
CO2 SERPL-SCNC: 21 MMOL/L (ref 20–32)
CREAT SERPL-MCNC: 0.92 MG/DL (ref 0.66–1.25)
EOSINOPHIL # BLD AUTO: 0 10E3/UL (ref 0–0.7)
EOSINOPHIL NFR BLD AUTO: 0 %
ERYTHROCYTE [DISTWIDTH] IN BLOOD BY AUTOMATED COUNT: 13.1 % (ref 10–15)
GFR SERPL CREATININE-BSD FRML MDRD: >90 ML/MIN/1.73M2
GLUCOSE BLD-MCNC: 93 MG/DL (ref 70–99)
HCT VFR BLD AUTO: 46.4 % (ref 40–53)
HGB BLD-MCNC: 16.4 G/DL (ref 13.3–17.7)
IMM GRANULOCYTES # BLD: 0 10E3/UL
IMM GRANULOCYTES NFR BLD: 0 %
LYMPHOCYTES # BLD AUTO: 2.2 10E3/UL (ref 0.8–5.3)
LYMPHOCYTES NFR BLD AUTO: 20 %
MCH RBC QN AUTO: 31.2 PG (ref 26.5–33)
MCHC RBC AUTO-ENTMCNC: 35.3 G/DL (ref 31.5–36.5)
MCV RBC AUTO: 88 FL (ref 78–100)
MONOCYTES # BLD AUTO: 0.8 10E3/UL (ref 0–1.3)
MONOCYTES NFR BLD AUTO: 7 %
NEUTROPHILS # BLD AUTO: 7.8 10E3/UL (ref 1.6–8.3)
NEUTROPHILS NFR BLD AUTO: 73 %
NRBC # BLD AUTO: 0 10E3/UL
NRBC BLD AUTO-RTO: 0 /100
PLATELET # BLD AUTO: 160 10E3/UL (ref 150–450)
POTASSIUM BLD-SCNC: 3.6 MMOL/L (ref 3.4–5.3)
PROT SERPL-MCNC: 7.5 G/DL (ref 6.8–8.8)
RBC # BLD AUTO: 5.25 10E6/UL (ref 4.4–5.9)
SARS-COV-2 RNA RESP QL NAA+PROBE: NEGATIVE
SODIUM SERPL-SCNC: 138 MMOL/L (ref 133–144)
TROPONIN I SERPL HS-MCNC: 4 NG/L
WBC # BLD AUTO: 10.8 10E3/UL (ref 4–11)

## 2022-03-14 PROCEDURE — 93005 ELECTROCARDIOGRAM TRACING: CPT | Performed by: EMERGENCY MEDICINE

## 2022-03-14 PROCEDURE — 99285 EMERGENCY DEPT VISIT HI MDM: CPT | Mod: 25 | Performed by: EMERGENCY MEDICINE

## 2022-03-14 PROCEDURE — 80053 COMPREHEN METABOLIC PANEL: CPT | Performed by: EMERGENCY MEDICINE

## 2022-03-14 PROCEDURE — 250N000013 HC RX MED GY IP 250 OP 250 PS 637: Performed by: FAMILY MEDICINE

## 2022-03-14 PROCEDURE — 85025 COMPLETE CBC W/AUTO DIFF WBC: CPT | Performed by: EMERGENCY MEDICINE

## 2022-03-14 PROCEDURE — 82075 ASSAY OF BREATH ETHANOL: CPT | Performed by: EMERGENCY MEDICINE

## 2022-03-14 PROCEDURE — 93010 ELECTROCARDIOGRAM REPORT: CPT | Performed by: EMERGENCY MEDICINE

## 2022-03-14 PROCEDURE — 84484 ASSAY OF TROPONIN QUANT: CPT | Performed by: EMERGENCY MEDICINE

## 2022-03-14 PROCEDURE — 90791 PSYCH DIAGNOSTIC EVALUATION: CPT

## 2022-03-14 PROCEDURE — C9803 HOPD COVID-19 SPEC COLLECT: HCPCS | Performed by: EMERGENCY MEDICINE

## 2022-03-14 PROCEDURE — 36415 COLL VENOUS BLD VENIPUNCTURE: CPT | Performed by: EMERGENCY MEDICINE

## 2022-03-14 PROCEDURE — 250N000009 HC RX 250: Performed by: FAMILY MEDICINE

## 2022-03-14 PROCEDURE — U0005 INFEC AGEN DETEC AMPLI PROBE: HCPCS | Performed by: EMERGENCY MEDICINE

## 2022-03-14 PROCEDURE — 71046 X-RAY EXAM CHEST 2 VIEWS: CPT

## 2022-03-14 RX ORDER — IBUPROFEN 600 MG/1
600 TABLET, FILM COATED ORAL ONCE
Status: COMPLETED | OUTPATIENT
Start: 2022-03-14 | End: 2022-03-14

## 2022-03-14 RX ORDER — BACITRACIN ZINC 500 [USP'U]/G
OINTMENT TOPICAL ONCE
Status: COMPLETED | OUTPATIENT
Start: 2022-03-14 | End: 2022-03-14

## 2022-03-14 RX ORDER — ACETAMINOPHEN 160 MG
TABLET,DISINTEGRATING ORAL
COMMUNITY
Start: 2022-02-24 | End: 2024-10-01

## 2022-03-14 RX ORDER — CALCIUM CARBONATE 500 MG/1
500 TABLET, CHEWABLE ORAL DAILY PRN
Status: DISCONTINUED | OUTPATIENT
Start: 2022-03-14 | End: 2022-03-15 | Stop reason: HOSPADM

## 2022-03-14 RX ADMIN — IBUPROFEN 600 MG: 600 TABLET ORAL at 20:04

## 2022-03-14 RX ADMIN — CALCIUM CARBONATE (ANTACID) CHEW TAB 500 MG 500 MG: 500 CHEW TAB at 19:09

## 2022-03-14 RX ADMIN — MICONAZOLE NITRATE: 2 POWDER TOPICAL at 21:56

## 2022-03-14 RX ADMIN — BACITRACIN ZINC: 500 OINTMENT TOPICAL at 21:56

## 2022-03-14 ASSESSMENT — ENCOUNTER SYMPTOMS
DIFFICULTY URINATING: 0
SHORTNESS OF BREATH: 0
COUGH: 0
NAUSEA: 0
CONFUSION: 0
EYE REDNESS: 0
CONSTIPATION: 0
ABDOMINAL PAIN: 0
HEADACHES: 0
FEVER: 0
DIARRHEA: 0
BACK PAIN: 0

## 2022-03-14 NOTE — Clinical Note
"Aftercare Plan  If I am feeling unsafe or I am in a crisis, I will:   Contact my established care providers   Call the National Suicide Prevention Lifeline: 169.512.9133   Go to the nearest emergency room   Call 911     Warning signs that I or other peop le might notice when a crisis is developing for me: Increased suicidal thoughts, alcohol intoxication, increased depression.    Things I am able to do on my own to cope or help me feel better: Stay sober, take medications as prescribed. Seek sober housin g.     Things that I am able to do with others to cope or help me feel better: Attend IOP and AA meetings, obtain a sponsor.     Things I can use or do for distraction: AA readings, meetings, fellow ship     Changes I can make to support my mental health  and wellness: Take medications as prescribed. Continue with IOP or seek a duel dx. Program.      People in my life that I can ask for help: Mother, friend Martha Leblanc,      Your [facility] has a mental health crisis team you can call 24/7: [facility]  [phone]    Crisis Lines  Crisis Text Line  Text 707538  You will be connected with a trained live crisis counselor to provide support.    Por espanol, texto  ABE a 833362 o texto a 442-AYUDAME en WhatsApp    National Hope Line  1.800.SUICIDE  [9851819]      Community Resources  Fast Tracker  Linking people to mental health and substance use disorder resources  fasttrackermn.org     [place] Mental Health Warm Line  Peer to peer support  Monday thru Saturday, 12 pm to 10 pm  651.288.04 00 or 1.266.302.5201  Text \"Support\" to 34037    National [employer] (MARYSE)  884.683.3603 or 1.888.MARYSE.HELPS    Mental Health Apps  My3  https://my3app.org/    VirtualHopeBox  https://[email or web address]org/apps/virtual-hope-box/          "
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
## 2022-03-14 NOTE — ED TRIAGE NOTES
Pt states he is here for suicidal ideation with no active plan; Hx of SI 3x with that being 7 years ago.  Pt states he has been having Si for 2 weeks,  Pt states his meds are off. Pt admits to occasional marijuana use,  Pt states he was drinking today with last drink 1.5 hour PTA;  Pt states he has Less SI thoughts when he is drinking. 0.201

## 2022-03-14 NOTE — ED PROVIDER NOTES
"ED Provider Note  [facility]      History     Chief Complaint   Patient presents with     Suicidal     HPI  Fran Lowery is a 41 year old male who presents emergency department for suicidal ideation with plan to hang himself yesterday.  Patient has a history of multiple prior suicide attempts most recently he reports approximately 7 years ago when he tried to overdose.  He reports that he is an alcoholic and had been sober but relapsed 2 days ago, has been drinking.  Last drink was approximately 2 hours prior to arrival.  He reports that yesterday was \"a bad day and I saw myself hanging myself.\"  He denies any coingestions.  He reports that he was in his usual state of health.  However, on further questioning he reports that he gets intermittent left-sided chest pain with exertion that has been worsening over the past approximately 1 year.  He has not sought any care for this.    Past Medical History  History reviewed. No pertinent past medical history.  Past Surgical History:   Procedure Laterality Date     ZZC NONSPECIFIC PROCEDURE  1999    repair of torn tendon in rt wrist     Cholecalciferol (VITAMIN D3) 50 MCG (2000 UT) CAPS  FLUoxetine (PROZAC) 40 MG capsule  lamoTRIgine (LAMICTAL) 25 MG tablet  traZODone (DESYREL) 50 MG tablet      No Known Allergies  Family History  Family History   Problem Relation Age of Onset     Hypertension Mother      Cerebrovascular Disease Mother      Alcohol/Drug Mother      Depression Mother      Eye Disorder Mother      Obesity Mother      Psychotic Disorder Mother      Alcohol/Drug Father      Arthritis Father      Eye Disorder Father      Obesity Father      Alcohol/Drug Maternal Grandfather      Alcohol/Drug Paternal Grandfather      Social History   Social History     Tobacco Use     Smoking status: Never Smoker     Smokeless tobacco: Never Used   Substance Use Topics     Alcohol use: Yes     Comment: binge     Drug use: Not Currently     " Comment: last use 1 week ago 2022      Past medical history, past surgical history, medications, allergies, family history, and social history were reviewed with the patient. No additional pertinent items.       Review of Systems   Constitutional: Negative for fever.   HENT: Negative for congestion.    Eyes: Negative for redness.   Respiratory: Negative for cough and shortness of breath.    Cardiovascular: Positive for chest pain.   Gastrointestinal: Negative for abdominal pain, constipation, diarrhea and nausea.   Genitourinary: Negative for difficulty urinating.   Musculoskeletal: Negative for back pain.   Skin: Negative for rash.   Neurological: Negative for headaches.   Psychiatric/Behavioral: Positive for suicidal ideas. Negative for confusion.     A complete review of systems was performed with pertinent positives and negatives noted in the HPI, and all other systems negative.    Physical Exam   BP: (!) 151/98  Pulse: 103  Temp: 97.8  F (36.6  C)  Resp: 14  Weight: 116.7 kg (257 lb 4.8 oz)  SpO2: 97 %  Physical Exam  Constitutional:       General: He is awake. He is not in acute distress.     Appearance: Normal appearance. He is well-developed. He is not ill-appearing or toxic-appearing.   HENT:      Head: Atraumatic.   Eyes:      General: No scleral icterus.     Pupils: Pupils are equal, round, and reactive to light.   Cardiovascular:      Rate and Rhythm: Normal rate and regular rhythm.      Heart sounds: Normal heart sounds, S1 normal and S2 normal.   Pulmonary:      Effort: No respiratory distress.      Breath sounds: Normal breath sounds.   Abdominal:      General: Bowel sounds are normal.      Palpations: Abdomen is soft.      Tenderness: There is no abdominal tenderness.   Musculoskeletal:         General: No tenderness.   Skin:     General: Skin is warm.      Findings: No rash.   Neurological:      Mental Status: He is alert and oriented to person, place, and time.   Psychiatric:         Mood and  Affect: Mood normal.         Speech: Speech normal.         Behavior: Behavior is cooperative.         Thought Content: Thought content includes suicidal ideation. Thought content includes suicidal plan.         ED Course      Procedures            EKG Interpretation:      Interpreted by CARLITA CRISTINA MD  Time reviewed: 1623  Symptoms at time of EKG: None   Rhythm: normal sinus   Rate: normal  Axis: normal  Ectopy: none  Conduction: normal  ST Segments/ T Waves: No ST-T wave changes  Q Waves: none  Comparison to prior: No old EKG available    Clinical Impression: normal EKG        Mental Health Risk Assessment      PSS-3    Date and Time Over the past 2 weeks have you felt down, depressed, or hopeless? Over the past 2 weeks have you had thoughts of killing yourself? Have you ever attempted to kill yourself? When did this last happen? User   03/14/22 1501 yes yes yes more than 6 months ago       C-SSRS ([place])    Date and Time Q1 Wished to be Dead (Past Month) Q2 Suicidal Thoughts (Past Month) Q3 Suicidal Thought Method Q4 Suicidal Intent without Specific Plan Q5 Suicide Intent with Specific Plan Q6 Suicide Behavior (Lifetime) Within the Past 3 Months? RETIRED: Level of Risk per Screen Screening Not Complete User   03/14/22 1621 yes yes yes -- yes yes -- -- -- [facility]   03/14/22 1501 yes yes no no no yes -- -- --               Suicide assessment completed by mental health (D.E.C., LCSW, etc.)       Results for orders placed or performed during the hospital encounter of 03/14/22   Comprehensive metabolic panel     Status: Abnormal   Result Value Ref Range    Sodium 138 133 - 144 mmol/L    Potassium 3.6 3.4 - 5.3 mmol/L    Chloride 108 94 - 109 mmol/L    Carbon Dioxide (CO2) 21 20 - 32 mmol/L    Anion Gap 9 3 - 14 mmol/L    Urea Nitrogen 13 7 - 30 mg/dL    Creatinine 0.92 0.66 - 1.25 mg/dL    Calcium 9.1 8.5 - 10.1 mg/dL    Glucose 93 70 - 99 mg/dL    Alkaline Phosphatase 44 40 - 150 U/L    AST 39 0 - 45 U/L    ALT  34 0 - 70 U/L    Protein Total 7.5 6.8 - 8.8 g/dL    Albumin 4.1 3.4 - 5.0 g/dL    Bilirubin Total 1.4 (H) 0.2 - 1.3 mg/dL    GFR Estimate >90 >60 mL/min/1.73m2   Troponin I     Status: Normal   Result Value Ref Range    Troponin I High Sensitivity 4 <79 ng/L   CBC with platelets and differential     Status: None   Result Value Ref Range    WBC Count 10.8 4.0 - 11.0 10e3/uL    RBC Count 5.25 4.40 - 5.90 10e6/uL    Hemoglobin 16.4 13.3 - 17.7 g/dL    Hematocrit 46.4 40.0 - 53.0 %    MCV 88 78 - 100 fL    MCH 31.2 26.5 - 33.0 pg    MCHC 35.3 31.5 - 36.5 g/dL    RDW 13.1 10.0 - 15.0 %    Platelet Count 160 150 - 450 10e3/uL    % Neutrophils 73 %    % Lymphocytes 20 %    % Monocytes 7 %    % Eosinophils 0 %    % Basophils 0 %    % Immature Granulocytes 0 %    NRBCs per 100 WBC 0 <1 /100    Absolute Neutrophils 7.8 1.6 - 8.3 10e3/uL    Absolute Lymphocytes 2.2 0.8 - 5.3 10e3/uL    Absolute Monocytes 0.8 0.0 - 1.3 10e3/uL    Absolute Eosinophils 0.0 0.0 - 0.7 10e3/uL    Absolute Basophils 0.0 0.0 - 0.2 10e3/uL    Absolute Immature Granulocytes 0.0 <=0.4 10e3/uL    Absolute NRBCs 0.0 10e3/uL   EKG 12-lead, tracing only     Status: None (Preliminary result)   Result Value Ref Range    Systolic Blood Pressure  mmHg    Diastolic Blood Pressure  mmHg    Ventricular Rate 86 BPM    Atrial Rate 86 BPM    CO Interval 158 ms    QRS Duration 102 ms     ms    QTc 461 ms    P Axis 73 degrees    R AXIS 23 degrees    T Axis 32 degrees    Interpretation ECG Sinus rhythm  Normal ECG      Alcohol breath test POCT     Status: Abnormal   Result Value Ref Range    Alcohol Breath Test 0.213 (A) 0.00 - 0.01   CBC with platelets differential     Status: None    Narrative    The following orders were created for panel order CBC with platelets differential.  Procedure                               Abnormality         Status                     ---------                               -----------         ------                     CBC with  platelets and d...[[id number]]                      Final result                 Please view results for these tests on the individual orders.     Medications - No data to display     Assessments & Plan (with Medical Decision Making)   Fran Lowery is a 41 year old male who presents emergency department for suicidal ideation with plan to hang himself yesterday.  Particularly concerning for decompensation of what is likely a major depressive disorder especially in the setting of drinking recently.  He denies any inciting event that caused him to relapse 2 to 3 days ago.  He reports chest pain with exertion, will perform work-up for this but low suspicion for acute issue.  He still has alcohol on board, will allow to metabolize, however anticipate that he will require admission given that he is experiencing SI with the plan and is a higher risk individual.  Consulted behavioral health , appreciate their recommendations.    I have reviewed the nursing notes. I have reviewed the findings, diagnosis, plan and need for follow up with the patient.    Chest pain evaluation here is unrevealing and reassuring.  Recommend outpatient follow-up, may need a stress test for further cardiac or stratification.  As far as his SI, he denied having true SI to the behavioral health .  Stated to her that he would never do that.  Recommendation is for crisis house.  She will work on obtaining him placement.  Patient signed out to oncoming team with plan to follow-up final recommendations from behavioral health .    New Prescriptions    No medications on file       Final diagnoses:   Alcohol use   Depression, unspecified depression type   Chest pain, unspecified type       --  Yohannes Gay MD PhD  [facility] EMERGENCY DEPARTMENT  3/14/2022     Yohannes Gay MD  03/14/22 6932

## 2022-03-15 LAB
ATRIAL RATE - MUSE: 86 BPM
DIASTOLIC BLOOD PRESSURE - MUSE: NORMAL MMHG
INTERPRETATION ECG - MUSE: NORMAL
P AXIS - MUSE: 73 DEGREES
PR INTERVAL - MUSE: 158 MS
QRS DURATION - MUSE: 102 MS
QT - MUSE: 386 MS
QTC - MUSE: 461 MS
R AXIS - MUSE: 23 DEGREES
SYSTOLIC BLOOD PRESSURE - MUSE: NORMAL MMHG
T AXIS - MUSE: 32 DEGREES
VENTRICULAR RATE- MUSE: 86 BPM

## 2022-03-15 NOTE — ED PROVIDER NOTES
Patient with complaints of bilateral thigh irriation x 3 days  Patient to be transferred to crisis residence once available.  Patient has been evaluated by previous MD refer to his note.  He complained primarily here of some bilateral irritation which examination of the patient's inner thighs reveals some erythema without marked drainage more consistent with abrasion irritative.  No crepitus otherwise noted.  Patient been treated with bacitracin topically along with miconazole powder.    Patient otherwise feeling stable was to be discharged to a crisis residence with these 2 medicines to use topically to help treat for irritative contact abrasion dermatitis.     Anupam Teixeira MD  03/15/22 7953

## 2022-03-15 NOTE — DISCHARGE INSTRUCTIONS
"Aftercare Plan    You will be going to [facility] [address], [place] [address]  [phone]  If I am feeling unsafe or I am in a crisis, I will:   Contact my established care providers   Call the National Suicide Prevention Lifeline: 492.451.9361   Go to the nearest emergency room   Call [phone]     Warning signs that I or other people might notice when a crisis is developing for me: Increased suicidal thoughts, alcohol intoxication, increased depression.    Things I am able to do on my own to cope or help me feel better: Stay sober, take medications as prescribed. Seek sober housing.     Things that I am able to do with others to cope or help me feel better: Attend IOP and AA meetings, obtain a sponsor.     Things I can use or do for distraction: AA readings, meetings, fellow ship     Changes I can make to support my mental health and wellness: Take medications as prescribed. Continue with IOP or seek a duel dx. Program.      People in my life that I can ask for help: Mother, friend Martha Leblanc,      Your [facility] has a mental health crisis team you can call 24/7: [facility]  [phone]    Crisis Lines  Crisis Text Line  Text 336973  You will be connected with a trained live crisis counselor to provide support.    Por espanol, texto  ABE a 197551 o texto a 442-AYUDAME en WhatsApp    National Hope Line  1.800.SUICIDE [2665323]      Community Resources  Fast Tracker  Linking people to mental health and substance use disorder resources  fasttrackermn.org     [place] Mental [facility] Warm Line  Peer to peer support  Monday thru Saturday, 12 pm to 10 pm  951.264.4831 or 1.003.112.4171  Text \"Support\" to 43732    National [place] [employer] Mental [employer]ness (MARYSE)  094.879.5601 or 1.888.MARYSE.HELPS    Mental Health Apps  My3  https://myManhattan Labspp.org/    VirtualHopeBox  https://Climeworks.org/apps/virtual-hope-box/    Use the bacitracin ointment to thighs twice daily along with the " miconazole powder.  See MD for follow up .

## 2022-03-15 NOTE — ED NOTES
Patient discharging to crisis center. Patient did not know medication doses and certain medication he was requesting was not in our medication list for patient. Patient was okay discharging without current meds.

## 2022-09-23 NOTE — PLAN OF CARE
OT Groups Attended: Clinic for 20 minutes (no charge), Leisure    Mood/Affect/General Presentation: Calm, engaged, congruent affect.     Patient Response: Pt actively participated in a structured occupational therapy group of 2 patients for 45 minutes with a focus on visuospatial problem solving and social engagement via a group game. Pt demonstrated understanding of the novel 3-step task after an initial explanation. Pt remained focused and engaged throughout the full duration of group, and appeared to brighten with social interaction.     Plan: Pt will be encouraged to maintain group attendance for continued ongoing assessment and support in completion of occupational therapy treatment goals.      Cimetidine Pregnancy And Lactation Text: This medication is Pregnancy Category B and is considered safe during pregnancy. It is also excreted in breast milk and breast feeding isn't recommended.

## 2022-11-14 ENCOUNTER — TELEPHONE (OUTPATIENT)
Dept: BEHAVIORAL HEALTH | Facility: CLINIC | Age: 42
End: 2022-11-14

## 2022-11-14 ENCOUNTER — HOSPITAL ENCOUNTER (EMERGENCY)
Facility: CLINIC | Age: 42
Discharge: PSYCHIATRIC OR SUBSTANCE USE FACILITY | End: 2022-11-17
Attending: EMERGENCY MEDICINE | Admitting: EMERGENCY MEDICINE
Payer: COMMERCIAL

## 2022-11-14 DIAGNOSIS — R45.851 SUICIDAL IDEATION: ICD-10-CM

## 2022-11-14 LAB
ALBUMIN SERPL-MCNC: 3.9 G/DL (ref 3.4–5)
ALP SERPL-CCNC: 45 U/L (ref 40–150)
ALT SERPL W P-5'-P-CCNC: 36 U/L (ref 0–70)
AMPHETAMINES UR QL: DETECTED
ANION GAP SERPL CALCULATED.3IONS-SCNC: 3 MMOL/L (ref 3–14)
APAP SERPL-MCNC: <2 MG/L (ref 10–30)
AST SERPL W P-5'-P-CCNC: 14 U/L (ref 0–45)
BARBITURATES UR QL SCN: NOT DETECTED
BASOPHILS # BLD AUTO: 0 10E3/UL (ref 0–0.2)
BASOPHILS NFR BLD AUTO: 0 %
BENZODIAZ UR QL SCN: DETECTED
BILIRUB SERPL-MCNC: 0.6 MG/DL (ref 0.2–1.3)
BUN SERPL-MCNC: 16 MG/DL (ref 7–30)
BUPRENORPHINE UR QL: NOT DETECTED
CALCIUM SERPL-MCNC: 9 MG/DL (ref 8.5–10.1)
CANNABINOIDS UR QL: DETECTED
CHLORIDE BLD-SCNC: 109 MMOL/L (ref 94–109)
CO2 SERPL-SCNC: 31 MMOL/L (ref 20–32)
COCAINE UR QL SCN: NOT DETECTED
CREAT SERPL-MCNC: 0.99 MG/DL (ref 0.66–1.25)
D-METHAMPHET UR QL: DETECTED
EOSINOPHIL # BLD AUTO: 0.1 10E3/UL (ref 0–0.7)
EOSINOPHIL NFR BLD AUTO: 1 %
ERYTHROCYTE [DISTWIDTH] IN BLOOD BY AUTOMATED COUNT: 13.2 % (ref 10–15)
ETHANOL SERPL-MCNC: <0.01 G/DL
GFR SERPL CREATININE-BSD FRML MDRD: >90 ML/MIN/1.73M2
GLUCOSE BLD-MCNC: 80 MG/DL (ref 70–99)
HCT VFR BLD AUTO: 46.8 % (ref 40–53)
HGB BLD-MCNC: 16.6 G/DL (ref 13.3–17.7)
IMM GRANULOCYTES # BLD: 0 10E3/UL
IMM GRANULOCYTES NFR BLD: 0 %
LYMPHOCYTES # BLD AUTO: 1.4 10E3/UL (ref 0.8–5.3)
LYMPHOCYTES NFR BLD AUTO: 21 %
MCH RBC QN AUTO: 32.8 PG (ref 26.5–33)
MCHC RBC AUTO-ENTMCNC: 35.5 G/DL (ref 31.5–36.5)
MCV RBC AUTO: 93 FL (ref 78–100)
METHADONE UR QL SCN: NOT DETECTED
MONOCYTES # BLD AUTO: 0.6 10E3/UL (ref 0–1.3)
MONOCYTES NFR BLD AUTO: 9 %
NEUTROPHILS # BLD AUTO: 4.7 10E3/UL (ref 1.6–8.3)
NEUTROPHILS NFR BLD AUTO: 69 %
NRBC # BLD AUTO: 0 10E3/UL
NRBC BLD AUTO-RTO: 0 /100
OPIATES UR QL SCN: NOT DETECTED
OXYCODONE UR QL SCN: NOT DETECTED
PCP UR QL SCN: NOT DETECTED
PLATELET # BLD AUTO: 158 10E3/UL (ref 150–450)
POTASSIUM BLD-SCNC: 4 MMOL/L (ref 3.4–5.3)
PROPOXYPH UR QL: NOT DETECTED
PROT SERPL-MCNC: 6.8 G/DL (ref 6.8–8.8)
RBC # BLD AUTO: 5.06 10E6/UL (ref 4.4–5.9)
SALICYLATES SERPL-MCNC: <2 MG/DL
SARS-COV-2 RNA RESP QL NAA+PROBE: NEGATIVE
SODIUM SERPL-SCNC: 143 MMOL/L (ref 133–144)
TRICYCLICS UR QL SCN: NOT DETECTED
WBC # BLD AUTO: 6.7 10E3/UL (ref 4–11)

## 2022-11-14 PROCEDURE — 90791 PSYCH DIAGNOSTIC EVALUATION: CPT

## 2022-11-14 PROCEDURE — 80143 DRUG ASSAY ACETAMINOPHEN: CPT | Performed by: EMERGENCY MEDICINE

## 2022-11-14 PROCEDURE — U0005 INFEC AGEN DETEC AMPLI PROBE: HCPCS | Performed by: EMERGENCY MEDICINE

## 2022-11-14 PROCEDURE — 80179 DRUG ASSAY SALICYLATE: CPT | Performed by: EMERGENCY MEDICINE

## 2022-11-14 PROCEDURE — 99285 EMERGENCY DEPT VISIT HI MDM: CPT | Performed by: EMERGENCY MEDICINE

## 2022-11-14 PROCEDURE — 82077 ASSAY SPEC XCP UR&BREATH IA: CPT | Performed by: EMERGENCY MEDICINE

## 2022-11-14 PROCEDURE — C9803 HOPD COVID-19 SPEC COLLECT: HCPCS

## 2022-11-14 PROCEDURE — 99285 EMERGENCY DEPT VISIT HI MDM: CPT | Mod: 25

## 2022-11-14 PROCEDURE — 250N000013 HC RX MED GY IP 250 OP 250 PS 637: Performed by: EMERGENCY MEDICINE

## 2022-11-14 PROCEDURE — 80053 COMPREHEN METABOLIC PANEL: CPT | Performed by: EMERGENCY MEDICINE

## 2022-11-14 PROCEDURE — 85025 COMPLETE CBC W/AUTO DIFF WBC: CPT | Performed by: EMERGENCY MEDICINE

## 2022-11-14 PROCEDURE — 36415 COLL VENOUS BLD VENIPUNCTURE: CPT | Performed by: EMERGENCY MEDICINE

## 2022-11-14 PROCEDURE — 80306 DRUG TEST PRSMV INSTRMNT: CPT | Performed by: EMERGENCY MEDICINE

## 2022-11-14 RX ORDER — TRAZODONE HYDROCHLORIDE 50 MG/1
50 TABLET, FILM COATED ORAL
Status: DISCONTINUED | OUTPATIENT
Start: 2022-11-14 | End: 2022-11-17 | Stop reason: HOSPADM

## 2022-11-14 RX ORDER — ARIPIPRAZOLE 2 MG/1
2 TABLET ORAL AT BEDTIME
COMMUNITY
Start: 2022-10-20 | End: 2024-10-01

## 2022-11-14 RX ORDER — ESCITALOPRAM OXALATE 10 MG/1
10 TABLET ORAL AT BEDTIME
COMMUNITY
Start: 2022-10-20 | End: 2024-10-01

## 2022-11-14 RX ORDER — VITAMIN B COMPLEX
25 TABLET ORAL DAILY
Status: DISCONTINUED | OUTPATIENT
Start: 2022-11-14 | End: 2022-11-17 | Stop reason: HOSPADM

## 2022-11-14 RX ORDER — ESCITALOPRAM OXALATE 10 MG/1
10 TABLET ORAL AT BEDTIME
Status: DISCONTINUED | OUTPATIENT
Start: 2022-11-14 | End: 2022-11-17 | Stop reason: HOSPADM

## 2022-11-14 RX ORDER — LAMOTRIGINE 25 MG/1
50 TABLET ORAL DAILY
Status: DISCONTINUED | OUTPATIENT
Start: 2022-11-14 | End: 2022-11-14 | Stop reason: CLARIF

## 2022-11-14 RX ORDER — ARIPIPRAZOLE 2 MG/1
2 TABLET ORAL DAILY
Status: DISCONTINUED | OUTPATIENT
Start: 2022-11-14 | End: 2022-11-15

## 2022-11-14 RX ADMIN — ARIPIPRAZOLE 2 MG: 2 TABLET ORAL at 19:36

## 2022-11-14 RX ADMIN — ESCITALOPRAM OXALATE 10 MG: 10 TABLET ORAL at 21:10

## 2022-11-14 RX ADMIN — Medication 25 MCG: at 19:36

## 2022-11-14 RX ADMIN — TRAZODONE HYDROCHLORIDE 50 MG: 50 TABLET ORAL at 21:10

## 2022-11-14 ASSESSMENT — ACTIVITIES OF DAILY LIVING (ADL)
ADLS_ACUITY_SCORE: 38

## 2022-11-14 NOTE — PLAN OF CARE
"Fran FITZGERALD [place]  November 14, 2022  Plan of Care Hand-off Note     Patient Care Path: Inpatient Mental Health    Plan for Care:     Patient is recommended for admission on behavioral health unit for safety, stabilization, and further evaluation of mental health risk. Patient is experiencing active and intrusive suicidal ideation and command hallucinations with plan to hang himself. He identifies having auditory hallucinations which he describes as coming from the \"thought yanely\" or devil. The voices are telling him, \"just do it\" and \"get it over with\". Visually he frequently observes and identifies places that would be great to try and hang himself.     Patient is has been in a [facility] treatment program for about the past week. He states that they are doing nothing there to manage his mental health. He unable to engage in safety planning to mitigate risk level in a non-secure setting. Lower levels of care would not be sufficient in managing the level of risk patient is presenting with. Due to this, IP is the least restricted option of care. Patient has been having suicide thoughts for the past week and today he finally reached out to staff to let them know he needed to go to the hospital.       Critical Safety Issues:  Active and intrusive suicide thoughts with command hallucinations    Overview:  This patient is a child/adolescent: No    This patient has additional special visitor precautions: No    Legal Status: Voluntary    Contacts:   EMIR RAMIREZ (Mother) [phone]         Psychiatry Consult:  No psych consult needed at this time    Updated Attending Provider regarding plan of care.    Dutch Reyes, [facility]      "
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
## 2022-11-14 NOTE — ED TRIAGE NOTES
Pt presents with SI and plan to hang himself. Pt has attempted before, last in May 2022 he was in [facility] and tried. Pt just got sober and his thoughts and emotions are overwhelming.      Triage Assessment     Row Name 11/14/22 0929       Triage Assessment (Adult)    Airway WDL WDL       Respiratory WDL    Respiratory WDL WDL       Cardiac WDL    Cardiac WDL WDL

## 2022-11-14 NOTE — ED PROVIDER NOTES
"  History     Chief Complaint   Patient presents with     Suicidal     HPI  Fran Lowery is a 42 year old male who presents to the emergency department secondary to suicidal ideation.  He has a history of bipolar disorder, PTSD, alcohol abuse, methamphetamine abuse.  He has been severely suicidal 4 times in the last year and a half.  He has \"lost everything\".  Currently he is in rehab for meth and alcohol.  He has not used any of his medications in over a month.  That is typical for him to stop all his medications when he is using.  He both smokes and injects methamphetamines.  He has had clear suicidal ideation and has had a plan to hang himself.  Its been persistent and he has been close to doing it but not as close as he has in the past when he was  hanging sheet while he was in [facility] and \"the hole \".  He has lost his relationship/marriage.  He lost the house to his ex-wife.  He is homeless when he is using.  He has been persistently thinking about hanging himself.    Allergies:  No Known Allergies    Problem List:    Patient Active Problem List    Diagnosis Date Noted     Suicidal ideation 11/17/2021     Priority: Medium     Bipolar 2 disorder (H) 11/17/2021     Priority: Medium     Alcohol dependence in remission (H) 11/17/2021     Priority: Medium     History of hip fracture 11/17/2021     Priority: Medium     Marijuana use 11/17/2021     Priority: Medium     CARDIOVASCULAR SCREENING; LDL GOAL LESS THAN 160 10/31/2010     Priority: Medium        Past Medical History:    No past medical history on file.    Past Surgical History:    Past Surgical History:   Procedure Laterality Date     ZZC NONSPECIFIC PROCEDURE  1999    repair of torn tendon in rt wrist       Family History:    Family History   Problem Relation Age of Onset     Hypertension Mother      Cerebrovascular Disease Mother      Alcohol/Drug Mother      Depression Mother      Eye Disorder Mother      Obesity Mother      Psychotic Disorder Mother "      Alcohol/Drug Father      Arthritis Father      Eye Disorder Father      Obesity Father      Alcohol/Drug Maternal Grandfather      Alcohol/Drug Paternal Grandfather        Social History:  Marital Status:   [2]  Social History     Tobacco Use     Smoking status: Never     Smokeless tobacco: Never   Substance Use Topics     Alcohol use: Yes     Comment: binge     Drug use: Not Currently     Comment: last use 1 week ago 2022        Medications:    Cholecalciferol (VITAMIN D3) 50 MCG (2000 UT) CAPS  FLUoxetine (PROZAC) 40 MG capsule  lamoTRIgine (LAMICTAL) 25 MG tablet  traZODone (DESYREL) 50 MG tablet          Review of Systems   All other systems reviewed and are negative.      Physical Exam   BP: (!) 132/98  Pulse: 73  Temp: 98  F (36.7  C)  Resp: 18  Weight: 111.1 kg (245 lb)  SpO2: 100 %      Physical Exam  Vitals and nursing note reviewed.   Constitutional:       General: He is not in acute distress.     Appearance: He is well-developed. He is not diaphoretic.   HENT:      Head: Normocephalic and atraumatic.      Right Ear: External ear normal.      Left Ear: External ear normal.      Nose: Nose normal.      Mouth/Throat:      Mouth: Mucous membranes are dry.   Eyes:      General: No scleral icterus.     Extraocular Movements: Extraocular movements intact.      Pupils: Pupils are equal, round, and reactive to light.   Cardiovascular:      Rate and Rhythm: Normal rate and regular rhythm.   Musculoskeletal:      Cervical back: Normal range of motion and neck supple.   Skin:     General: Skin is warm and dry.      Findings: No rash.   Neurological:      Mental Status: He is alert and oriented to person, place, and time.   Psychiatric:      Comments: Somewhat agitated but coherent.  Clear suicidal ideation.  No active hallucinations.         ED Course     Mental Health Risk Assessment      PSS-3    Date and Time Over the past 2 weeks have you felt down, depressed, or hopeless? Over the past 2 weeks have  you had thoughts of killing yourself? Have you ever attempted to kill yourself? When did this last happen? User   11/14/22 0929 yes yes yes between 1 and 6 months ago CM      C-SSRS ([place])    Date and Time Q1 Wished to be Dead (Past Month) Q2 Suicidal Thoughts (Past Month) Q3 Suicidal Thought Method Q4 Suicidal Intent without Specific Plan Q5 Suicide Intent with Specific Plan Q6 Suicide Behavior (Lifetime) Within the Past 3 Months? RETIRED: Level of Risk per Screen Screening Not Complete User   11/14/22 0929 yes yes yes yes yes yes -- -- -- CM                   Procedures           No results found for this or any previous visit (from the past 24 hour(s)).    Medications - No data to display    Assessments & Plan (with Medical Decision Making)  Suicidal ideation with a plan.  Patient is cooperative with admission to psychiatric facility.  We will get screening labs.  He has clear suicidal ideation with a plan.  He was evaluated by DEC and that they agree that he is appropriate for inpatient admission.  The patient was placed on a health officer hold.  Labs are drawn and COVID swab performed.  We will start the bed search.  Labs are reassuring.  Patient does have a abnormal drug screen which is to be expected.  He is cooperative.  Patient signed over to Dr. Leal at change of shift pending placement.      I have reviewed the nursing notes.    I have reviewed the findings, diagnosis, plan and need for follow up with the patient.      New Prescriptions    No medications on file       Final diagnoses:   None       11/14/2022   [facility] EMERGENCY DEPT     Yohannes Bates MD  11/14/22 2045

## 2022-11-15 ENCOUNTER — TELEPHONE (OUTPATIENT)
Dept: BEHAVIORAL HEALTH | Facility: CLINIC | Age: 42
End: 2022-11-15

## 2022-11-15 LAB — APAP SERPL-MCNC: <5 UG/ML

## 2022-11-15 PROCEDURE — 250N000013 HC RX MED GY IP 250 OP 250 PS 637: Performed by: EMERGENCY MEDICINE

## 2022-11-15 PROCEDURE — 250N000013 HC RX MED GY IP 250 OP 250 PS 637: Performed by: FAMILY MEDICINE

## 2022-11-15 RX ORDER — TERBINAFINE HYDROCHLORIDE 250 MG/1
250 TABLET ORAL DAILY
COMMUNITY
Start: 2022-10-20 | End: 2024-10-01

## 2022-11-15 RX ORDER — LURASIDONE HYDROCHLORIDE 40 MG/1
1 TABLET, FILM COATED ORAL DAILY
COMMUNITY
Start: 2022-10-17 | End: 2024-10-01

## 2022-11-15 RX ORDER — IBUPROFEN 600 MG/1
600 TABLET, FILM COATED ORAL ONCE
Status: COMPLETED | OUTPATIENT
Start: 2022-11-15 | End: 2022-11-15

## 2022-11-15 RX ORDER — ARIPIPRAZOLE 2 MG/1
2 TABLET ORAL EVERY EVENING
Status: DISCONTINUED | OUTPATIENT
Start: 2022-11-16 | End: 2022-11-17 | Stop reason: HOSPADM

## 2022-11-15 RX ORDER — NALTREXONE HYDROCHLORIDE 50 MG/1
50 TABLET, FILM COATED ORAL DAILY
COMMUNITY
Start: 2022-10-21 | End: 2024-10-01

## 2022-11-15 RX ADMIN — ESCITALOPRAM OXALATE 10 MG: 10 TABLET ORAL at 20:22

## 2022-11-15 RX ADMIN — Medication 25 MCG: at 20:22

## 2022-11-15 RX ADMIN — IBUPROFEN 600 MG: 600 TABLET ORAL at 04:05

## 2022-11-15 RX ADMIN — TRAZODONE HYDROCHLORIDE 50 MG: 50 TABLET ORAL at 20:38

## 2022-11-15 RX ADMIN — ARIPIPRAZOLE 2 MG: 2 TABLET ORAL at 20:21

## 2022-11-15 ASSESSMENT — ACTIVITIES OF DAILY LIVING (ADL)
ADLS_ACUITY_SCORE: 38

## 2022-11-15 NOTE — TELEPHONE ENCOUNTER
11/15 Bed search update 5:30pm     [facility]: No beds available  [facility]: : No beds available  [facility]: No beds available  [facility]: No beds available  [facility]: No beds available  regions: No beds available  [facility]: No beds available  [facility]: No beds available  [facility]: No beds available   [facility]: No beds available   [facility]: [facility] available  [facility]: No beds available   [facility]: No beds available  [facility]: 1 bed available - Low acuity only   [facility]: No beds available  [facility]:  No beds available   [facility]: No beds available  [facility]: No beds available   [facility]: No beds available   [facility]: 1 bed available - Low acuity only, VOL only  [facility]: No beds available   [facility]: Posting 4 beds; low acuity  [facility]: No beds available  [facility]: No beds available  [facility]: No beds available  [facility]: No [facility] available  [facility]: Posting 3 beds, Low acuity       Pt not appropriate for any available beds, remains on waitlist

## 2022-11-15 NOTE — ED NOTES
"Triage & Transition Services, Extended Care     Therapy Progress Note    Patient: Alfie goes by \"Alfie,\" uses he/him pronouns  Date of Service: November 15, 2022  Site of Service:  ED  Patient was seen virtually (Audience[employer] cart or other teleconferencing device).     Presenting problem:   Alfei is followed related to Placement delay: 30+ hours at the time of this note. Please see initial DEC/[facility] Crisis Assessment completed by Dutch Reyes on 11/14/22 for complete assessment information. Notable concerns include suicidal ideation, auditory hallucinations, and paranoia.     Individuals Present: Alfie & Noah Sanchez    Session start: 2:59 PM  Session end: 3:15 PM  Session duration in minutes: 16  Session number: 1  Anticipated number of sessions or this episode of care: 1-4  CPT utilized: 85037 - Psychotherapy (with patient) - 30 (16-37*) min    Current Presentation:   Patient is awake and alert with writer. He does appear to be paranoid at times, and discussed ongoing suicidal thoughts with writer. Patient identifies feeling overwhelmed with daily tasks, and reports that \"small tasks feel like climbing a mountain\". Patient reports that he feels [facility] has not been helpful for his mental health, and despite being able to keep him sober he endorses that this has lead to an increase in his recent suicidal thoughts. Patient reports that he used substances as \"medicine\" and does identify that he feels he needs to be on \"mental health medications\". About 8 minutes into meeting with writer patient became very guarded and began asking writer, \"What are your intentions, What do you want from me.\" Writer re-explained his role, which seemed to help patinet calm down, and he was able to continue to engage in conversation. Patient does continue to endorse ongoing suicidal ideation, and auditory hallucinations.     Mental Status Exam:   Appearance: awake, alert  Attitude: guarded and somewhat cooperative  Eye " Contact: fair, looking around room  Mood: anxious and depressed  Affect: intensity is blunted  Speech: clear, coherent  Psychomotor Behavior: no evidence of tardive dyskinesia, dystonia, or tics  Thought Process:  circumstantial  Associations: no loose associations  Thought Content: passive suicidal ideation present and auditory hallucinations present, paranoia  Insight: limited  Judgement: fair  Oriented to: time, person, and place  Attention Span and Concentration: fair  Recent and Remote Memory: fair    Diagnosis:     F31.9 Unspecified bipolar and related disorder, by history    F43.10 Posttraumatic stress disorder, by history    F15.90 Other stimulant use, unspecified, uncomplicated, by history    Therapeutic Intervention(s):   Provided active listening, unconditional positive regard, and validation. Engaged in cognitive restructuring/ reframing, looked at common cognitive distortions and challenged negative thoughts. Engaged in guided discovery, explored patient's perspectives and helped expand them through socratic dialogue. Engaged in relaxation training (e.g. meditation, progressive muscle relaxation, etc.).    Treatment Objective(s) Addressed:   The focus of this session was on rapport building, orienting the patient to therapy, assessing safety and identifying treatment goals.     Progress Towards Goals:   Patient reports no change in symptoms. Patient does engage in therapy with writer; however his symptoms do appear to impact his ability to fully engage with writer.    General Recommendations:   Continue to monitor for harm. Consider: Use a positive, direct and calm approach. Pt's tend to match the energy/mood of the staff. Keep focus positive and upbeat, Listen in a neutral, non-judgmental way. Offer reassurance and Be mindful of your nonverbal cues (body language, facial expressions)    Plan:   Writer continues to recommend inpatient mental health placement as patient continues to have suicidal  thoughts, paranoia, and auditory hallucinations. While patient is currently on a 72HH, it is important to note patient is agreeable to admit to inpatient mental health voluntarily, Writer did review parameters of 72HH with provider, and explained that as patient is voluntary there is not a need for a 72HH at this time.    Plan for Care reviewed with Assigned Medical Provider? Yes. Provider, Dr. Davila, response: in agreement with blair.     Noah Sanchez   Licensed Mental Health Professional (LMHP), [facility]  [phone]

## 2022-11-15 NOTE — TELEPHONE ENCOUNTER
No appropriate beds currently available within the  system. Bed search update (statewide) @ 01:42:       [facility]: @ cap per website      [facility]: @ cap per website      [facility]: @ cap per website      [facility]: @ cap per website      [facility]: @ cap per website      [facility]: @ cap per website  [facility]: @ cap per website      [facility]: @ cap per website      [facility]: Posting 1 bed. Low acuity / Mixed unit. Per Mary @ 01:44 they are reviewing other referrals, but she will call intake back if bed is still available    [facility]: @ cap per website      [facility]: @ cap per website      [facility]: @ cap per website      [facility]: @ cap per website      [facility]: @ cap per website      [facility]: Posting 1 bed. Very Low acuity. Per Leilani @ 01:58 they are at capacity for [facility]: Posting 1 bed. Low acuity only. Per Leilani @ 01:58 they are at capacity for [facility]: Posting 2 beds. Voluntary patients only. No hx of aggression. Meets exclusionary d/t 72 HH       [facility]: Posting 5 beds. Low acuity. Must have the cognitive ability to do programming. No aggressive or violent behavior or recent HX in the last 2 yrs. MH must be primary. Per Mary @ 02:05 they are already reviewing other referrals       [facility]: @ cap per website      [facility]: @ cap per website  [facility]: @ cap per website  [facility]: Posting 3 beds. Unable to accepts high acuity at this time.            Pt remains on work list until appropriate placement is available

## 2022-11-15 NOTE — TELEPHONE ENCOUNTER
R: Pt in [facility] ER awaiting placement. Per chart, anywhere for placement    Adults:  R: 7:30pm- Bed Search Update    [facility] is at capacity.  [facility] is posting 0 beds.  [facility] is posting 0 beds.  [facility] is posting 0 beds.  [facility] is posting 0 beds.  [facility] is posting 0 beds.   [facility] is [facility] beds.    [facility] is posting 1 bed. Mixed unit 12+. Low acuity only.  [facility] posting 1 bed.  Low acuity only.    [facility] is positing 2 beds. No aggression.  [facility] is posting 0 beds.  [facility] is posting 0 beds. Low acuity only.  [facility] is posting 0 bed.  [facility] is posting 1 beds. Low acuity.  [facility] is posting 0 bed. 72 hr hold required.  [facility] is posting 1 beds.  Low acuity only.    [facility] is posting 2 beds. Vol only, No Hx of aggression, violence or assault. No sexual offenders. No 72 hr holds.   [facility] is posting 5 beds. (Must have the cognitive ability to do programming. No aggressive or violent behavior or recent HX in the last 2 yrs. MH must be primary.)  [facility] is posting 0 beds. Low acuity only. Violence and aggression capped.   [facility] is posting 0 beds. Low acuity, Neg Covid.  [facility] posted 0 beds.  Low acuity only.  No violence or aggression.  [facility] is posting 0 beds. Covid neg. Vol only. Combined adolescent and adult unit. No aggressive or violent behavior. No registered sex offenders.   [facility] is posting 4 beds.  Unable to accept high acuity at this time.   [facility] is posting 3 beds.       Pt remains on waitlist pending available bed.

## 2022-11-16 ENCOUNTER — TELEPHONE (OUTPATIENT)
Dept: BEHAVIORAL HEALTH | Facility: CLINIC | Age: 42
End: 2022-11-16

## 2022-11-16 PROCEDURE — 250N000013 HC RX MED GY IP 250 OP 250 PS 637: Performed by: EMERGENCY MEDICINE

## 2022-11-16 RX ORDER — LORAZEPAM 1 MG/1
1 TABLET ORAL 2 TIMES DAILY PRN
Status: DISCONTINUED | OUTPATIENT
Start: 2022-11-16 | End: 2022-11-17 | Stop reason: HOSPADM

## 2022-11-16 RX ADMIN — Medication 25 MCG: at 22:07

## 2022-11-16 RX ADMIN — ESCITALOPRAM OXALATE 10 MG: 10 TABLET ORAL at 22:08

## 2022-11-16 RX ADMIN — LORAZEPAM 1 MG: 1 TABLET ORAL at 15:32

## 2022-11-16 RX ADMIN — ARIPIPRAZOLE 2 MG: 2 TABLET ORAL at 22:08

## 2022-11-16 ASSESSMENT — ACTIVITIES OF DAILY LIVING (ADL)
ADLS_ACUITY_SCORE: 38

## 2022-11-16 NOTE — TELEPHONE ENCOUNTER
No appropriate beds currently available within the  system. Bed search update (statewide) @ 04:04:      [facility]: @ cap per website      [facility]: @ cap per website      [facility]: @ cap per website      [facility]: @ cap per website      [facility]: @ cap per website      [facility]: @ cap per website  [facility]: @ cap per website      [facility]: @ cap per website      [facility]: Posting 1 bed. Low acuity / Mixed unit. Called @ 04:04 and they are able to review. Faxed clinical @  04:10    Awaiting callback from [facility] called @ 05:42 and pt was declined d/t acuity    Pt remains on work list until appropriate placement is available

## 2022-11-16 NOTE — ED NOTES
"Triage & Transition Services, Extended Care     Therapy Progress Note    Patient: Alfie goes by \"Alfie,\" uses he/him pronouns  Date of Service: November 16, 2022  Site of Service:  ED  Patient was seen virtually ([employer] cart or other teleconferencing device).     Presenting problem:   Alfie is followed related to Placement delay: boarding 50+ hours at the time of this note. Please see initial DEC/[facility] Crisis Assessment completed by Dutch Reyes for complete assessment information. Notable concerns include suicidal ideation, auditory hallucinations, and paranoia.     Individuals Present: Alfie & Noah Sanchez    Session start: 10:33 AM  Session end: 11:01 AM  Session duration in minutes: 28  Session number: 2  Anticipated number of sessions or this episode of care: 1-4  CPT utilized: 91160 - Psychotherapy (with patient) - 30 (16-37*) min    Current Presentation:   Patient is alert and oriented today. He reports that he continues to have daily suicidal thoughts with a plan to hang himself. Patient does appear to have a more logical thought process today and goes into more details about his suicidal thoughts and reports they have been very frequent over the past 5 days. Patient reports that when these happens he feels \"waves of despair and hopelessness, which in turn make me want to end things, or use.\" Patient elaborates on his cycle of suicidal ideation and addiction, and after processing this cycle with writer, he does report that \"getting my mental health more stable might lead to a better outcome in CD treatment\". Patient does show some insight into his mental health and reports, \"these quick ups and downs are nearly impossible to manage on my own\". Patient and writer did discuss some coping skills he can use, and reflected back on times of his life when things have been going well. Patient reports that he is feeling a lack of connectedness leading increased hopelessness, prior to his suicidal " "thoughts increasing. Patient does appear to be anxious stating at times, \"I don't want to take away from others getting help\". Writer did validate patients feelings but helped him reframe this thought process.     Mental Status Exam:   Appearance: awake, alert  Attitude: cooperative  Eye Contact: good  Mood: anxious and depressed  Affect: mood congruent  Speech: clear, coherent  Psychomotor Behavior: no evidence of tardive dyskinesia, dystonia, or tics  Thought Process:  logical and linear  Associations: no loose associations  Thought Content: plan for suicide present   Insight: fair  Judgement: fair  Oriented to: time, person, and place  Attention Span and Concentration: intact  Recent and Remote Memory: intact    Diagnosis:     F31.9 Unspecified bipolar and related disorder, by history    F43.10 Posttraumatic stress disorder, by history    F15.90 Other stimulant use, unspecified, uncomplicated, by history    Therapeutic Intervention(s):   Provided active listening, unconditional positive regard, and validation. Engaged in cognitive restructuring/ reframing, looked at common cognitive distortions and challenged negative thoughts. Engaged in guided discovery, explored patient's perspectives and helped expand them through socratic dialogue.    Treatment Objective(s) Addressed:   The focus of this session was on rapport building, processing feelings related to hopelessness, assessing safety and identifying treatment goals.     Progress Towards Goals:   Patient reports slight improvement in symptoms. Patient continues to engage in therapy, and reports knowing he needs help, as well as being willing to engage in goal work    General Recommendations:   Continue to monitor for harm. Consider: Use clear and concise directions, too many words can be overwhelming and Listen in a neutral, non-judgmental way. Offer reassurance    Plan:   Writer continues to recommend inpatient mental health placement for ongoing suicidal " ideation with a plan to hang himself. Patient continues to agree to voluntary admission at this time.    Plan for Care reviewed with Assigned Medical Provider? Yes. Provider, Dr. Coe, response: in agreement with plan     Noah Sanchez   Licensed Mental Health Professional (LMHP), [facility]  [phone]

## 2022-11-16 NOTE — TELEPHONE ENCOUNTER
R:  11/16/22 11:15am  Call from [facility] ext care Noah.  Pt is now voluntary.  Pt requested placement in IRTS facility-per Noah, pt not appropriate for an IRTS, recommending IP MH stay first.  Work list updated.    R:  11:55am  PSJ reviewing

## 2022-11-17 VITALS
BODY MASS INDEX: 31.46 KG/M2 | WEIGHT: 245 LBS | OXYGEN SATURATION: 97 % | HEART RATE: 64 BPM | DIASTOLIC BLOOD PRESSURE: 75 MMHG | SYSTOLIC BLOOD PRESSURE: 122 MMHG | TEMPERATURE: 97.6 F | RESPIRATION RATE: 16 BRPM

## 2022-11-17 ASSESSMENT — ACTIVITIES OF DAILY LIVING (ADL)
ADLS_ACUITY_SCORE: 38

## 2022-11-17 NOTE — ED PROVIDER NOTES
Patient signed out to me this morning at change of shift.  He is still expressing suicidality.  He was eventually excepted in transfer at [facility] in [facility].  He has remained stable here.  He will be transported via ambulance.     Rudy Coe MD  11/16/22 1953

## 2022-11-17 NOTE — ED NOTES
Report phoned to [facility] in [place]. Receiving nurse was Zahida. Receiving facility is aware that pt will not be transferring until 0500 at the earliest.

## 2022-11-17 NOTE — TELEPHONE ENCOUNTER
00:06 - Called [facility] confirms that pt was accepted to facility. Per notes, pt cannot transfer until 0500 at the earliest - informed Nemesio

## 2022-11-17 NOTE — ED PROVIDER NOTES
Patient was signed out to me at change of shift by Dr. Coe.  Patient had no issues overnight.    Ambulance was delayed and patient will be leaving hopefully a little bit after 7 AM       Wesley Davila MD  11/17/22 0648

## 2024-06-18 ENCOUNTER — MEDICAL CORRESPONDENCE (OUTPATIENT)
Dept: HEALTH INFORMATION MANAGEMENT | Facility: CLINIC | Age: 44
End: 2024-06-18

## 2024-09-13 ENCOUNTER — TRANSFERRED RECORDS (OUTPATIENT)
Dept: HEALTH INFORMATION MANAGEMENT | Facility: CLINIC | Age: 44
End: 2024-09-13
Payer: COMMERCIAL

## 2024-09-23 ENCOUNTER — TELEPHONE (OUTPATIENT)
Dept: BEHAVIORAL HEALTH | Facility: CLINIC | Age: 44
End: 2024-09-23
Payer: COMMERCIAL

## 2024-09-23 NOTE — TELEPHONE ENCOUNTER
9/23/2024    Received external referral from [facility]. Emailed referral to LP admissions team and faxed to [facility].     Pema Tejada [facility] - Patient Navigator   [email or web address]  Direct phone: [phone]

## 2024-09-24 NOTE — TELEPHONE ENCOUNTER
Writer reviewed assessment. Pt is rated a 3 in dimension 3. Pt has been hospitalized 25-30 times for MH. MH dx include Antisocial personality Disorder, Bipolar disorder, depression, and PTSD. PT has had significant hx of SI with plan and intent, exacerbated by using and going off his medications.     Writer contacted [facility] and inquired about the pt. Pt is now stable on medications as he admitted to detox on 9/12. LP will require an updated assessment indicating pt is now stable on medications.     Writer talked with Stacey at [facility] and left a message for Lucina Álvarez, .

## 2024-09-25 ENCOUNTER — TELEPHONE (OUTPATIENT)
Dept: BEHAVIORAL HEALTH | Facility: CLINIC | Age: 44
End: 2024-09-25
Payer: COMMERCIAL

## 2024-09-25 NOTE — TELEPHONE ENCOUNTER
----- Message from Errol Perez sent at 9/25/2024 12:21 PM CDT -----  Regarding: check benefits  Please check benefits for LP.    Thanks,  Lloyd

## 2024-09-25 NOTE — TELEPHONE ENCOUNTER
Writer received updated assessment indicating pt is now psychiatrically stable and is rated a 2 in dimension 3. Pt added to LP list pending insurance verification.
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
## 2024-09-30 ENCOUNTER — HOSPITAL ENCOUNTER (OUTPATIENT)
Facility: CLINIC | Age: 44
Setting detail: OBSERVATION
Discharge: HOME | End: 2024-10-02
Attending: EMERGENCY MEDICINE | Admitting: EMERGENCY MEDICINE
Payer: COMMERCIAL

## 2024-09-30 DIAGNOSIS — F41.9 ANXIETY: ICD-10-CM

## 2024-09-30 DIAGNOSIS — F10.20 ALCOHOL USE DISORDER, MODERATE, DEPENDENCE (H): ICD-10-CM

## 2024-09-30 DIAGNOSIS — F15.90 STIMULANT USE DISORDER: ICD-10-CM

## 2024-09-30 DIAGNOSIS — F39 MOOD DISORDER (H): ICD-10-CM

## 2024-09-30 DIAGNOSIS — R45.851 SUICIDAL IDEATION: ICD-10-CM

## 2024-09-30 LAB
ALBUMIN SERPL BCG-MCNC: 4.5 G/DL (ref 3.5–5.2)
ALP SERPL-CCNC: 44 U/L (ref 40–150)
ALT SERPL W P-5'-P-CCNC: 30 U/L (ref 0–70)
ANION GAP SERPL CALCULATED.3IONS-SCNC: 14 MMOL/L (ref 7–15)
AST SERPL W P-5'-P-CCNC: 69 U/L (ref 0–45)
BILIRUB SERPL-MCNC: 0.6 MG/DL
BUN SERPL-MCNC: 19 MG/DL (ref 6–20)
CALCIUM SERPL-MCNC: 9.2 MG/DL (ref 8.8–10.4)
CHLORIDE SERPL-SCNC: 108 MMOL/L (ref 98–107)
CREAT SERPL-MCNC: 1.27 MG/DL (ref 0.67–1.17)
EGFRCR SERPLBLD CKD-EPI 2021: 72 ML/MIN/1.73M2
ERYTHROCYTE [DISTWIDTH] IN BLOOD BY AUTOMATED COUNT: 12.8 % (ref 10–15)
ETHANOL SERPL-MCNC: 0.16 G/DL
GLUCOSE SERPL-MCNC: 91 MG/DL (ref 70–99)
HCO3 SERPL-SCNC: 23 MMOL/L (ref 22–29)
HCT VFR BLD AUTO: 42.3 % (ref 40–53)
HGB BLD-MCNC: 14.5 G/DL (ref 13.3–17.7)
MCH RBC QN AUTO: 31.7 PG (ref 26.5–33)
MCHC RBC AUTO-ENTMCNC: 34.3 G/DL (ref 31.5–36.5)
MCV RBC AUTO: 93 FL (ref 78–100)
PLATELET # BLD AUTO: 176 10E3/UL (ref 150–450)
POTASSIUM SERPL-SCNC: 4.1 MMOL/L (ref 3.4–5.3)
PROT SERPL-MCNC: 6.7 G/DL (ref 6.4–8.3)
RBC # BLD AUTO: 4.57 10E6/UL (ref 4.4–5.9)
SODIUM SERPL-SCNC: 145 MMOL/L (ref 135–145)
WBC # BLD AUTO: 7.1 10E3/UL (ref 4–11)

## 2024-09-30 PROCEDURE — 36415 COLL VENOUS BLD VENIPUNCTURE: CPT | Performed by: EMERGENCY MEDICINE

## 2024-09-30 PROCEDURE — 85027 COMPLETE CBC AUTOMATED: CPT | Performed by: EMERGENCY MEDICINE

## 2024-09-30 PROCEDURE — 82077 ASSAY SPEC XCP UR&BREATH IA: CPT | Performed by: EMERGENCY MEDICINE

## 2024-09-30 PROCEDURE — 99285 EMERGENCY DEPT VISIT HI MDM: CPT

## 2024-09-30 PROCEDURE — 82040 ASSAY OF SERUM ALBUMIN: CPT | Performed by: EMERGENCY MEDICINE

## 2024-09-30 PROCEDURE — 250N000013 HC RX MED GY IP 250 OP 250 PS 637: Performed by: EMERGENCY MEDICINE

## 2024-09-30 RX ORDER — IBUPROFEN 600 MG/1
600 TABLET, FILM COATED ORAL ONCE
Status: COMPLETED | OUTPATIENT
Start: 2024-09-30 | End: 2024-09-30

## 2024-09-30 RX ORDER — LAMOTRIGINE 100 MG/1
300 TABLET, EXTENDED RELEASE ORAL ONCE
Status: COMPLETED | OUTPATIENT
Start: 2024-09-30 | End: 2024-09-30

## 2024-09-30 RX ORDER — LAMOTRIGINE 100 MG/1
300 TABLET, EXTENDED RELEASE ORAL DAILY
Status: DISCONTINUED | OUTPATIENT
Start: 2024-10-01 | End: 2024-10-02 | Stop reason: HOSPADM

## 2024-09-30 RX ORDER — ESCITALOPRAM OXALATE 20 MG/1
20 TABLET ORAL DAILY
Status: DISCONTINUED | OUTPATIENT
Start: 2024-10-01 | End: 2024-10-02 | Stop reason: HOSPADM

## 2024-09-30 RX ADMIN — IBUPROFEN 600 MG: 600 TABLET ORAL at 20:26

## 2024-09-30 RX ADMIN — LAMOTRIGINE EXTENDED-RELEASE 300 MG: 100 TABLET, FILM COATED, EXTENDED RELEASE ORAL at 20:26

## 2024-09-30 ASSESSMENT — ACTIVITIES OF DAILY LIVING (ADL)
ADLS_ACUITY_SCORE: 36
ADLS_ACUITY_SCORE: 38

## 2024-09-30 ASSESSMENT — COLUMBIA-SUICIDE SEVERITY RATING SCALE - C-SSRS
1. IN THE PAST MONTH, HAVE YOU WISHED YOU WERE DEAD OR WISHED YOU COULD GO TO SLEEP AND NOT WAKE UP?: YES
4. HAVE YOU HAD THESE THOUGHTS AND HAD SOME INTENTION OF ACTING ON THEM?: YES
2. HAVE YOU ACTUALLY HAD ANY THOUGHTS OF KILLING YOURSELF IN THE PAST MONTH?: YES
5. HAVE YOU STARTED TO WORK OUT OR WORKED OUT THE DETAILS OF HOW TO KILL YOURSELF? DO YOU INTEND TO CARRY OUT THIS PLAN?: NO
3. HAVE YOU BEEN THINKING ABOUT HOW YOU MIGHT KILL YOURSELF?: NO
6. HAVE YOU EVER DONE ANYTHING, STARTED TO DO ANYTHING, OR PREPARED TO DO ANYTHING TO END YOUR LIFE?: NO

## 2024-09-30 NOTE — ED PROVIDER NOTES
Emergency Department Note      History of Present Illness     Chief Complaint   Suicidal      HPI   Fran Lowery is a 43 year old male with history of alcohol abuse and depression who presents for suicidal ideation and alcohol intoxication. The patient reports having suicidal thoughts since he was 8. Reports 4 episodes of suicidal ideation today. Reports if he was going to kill himself he would have done it already. Denies homicidal ideation. He reports his  told him to come her. Reports he has not taken his medication in 2-3 days and he desperately wants it back. He takes 20 mg Lexapro and 300 mg lamotrigine, typically in the morning. Reports he might have lost his. Endorses alcohol and cannabis use. Denies other substance use. Reports binge drinking the last 2 days after 3 weeks sober, which is a common pattern. Reports feeling fine 90% of the time but when he feels bad he cannot function at all, he cannot even brush his teeth. He reports when he feels this way it takes a few days to become functioning again. Reports a blown disk in his middle back which is causing pain. Reports peeing 4-5 times each night for the last 6 months which is a noticeable increase for him. Nurse reports he walked to the cafeteria and security had to retrieve him with a wheel chair. Patient was then ambulatory in the room per the nurse. The nurse also reports he has slept outside the last 4 days. Patient reports a period of 3 years of homelessness.     Independent Historian   None      Past Medical History     Medical History and Problem List   Alcohol dependence  Alcohol withdrawal  Antisocial personality disorder  ADHD  Brief psychotic disorder  Cannabis abuse  Chronic low back pain  Cocaine abuse   Depression  Infertility male  Malingering to avoid prison time  Methamphetamine use disorder, severe  [place]byopia  PTSD  Astigmatism  Other stimulant abuse  Bipolar 2 disorder  SI    Medications   Escitalopram    Lamotrigine       Surgical History   Repair of torn tendon in wrist (R)      Physical Exam     Patient Vitals for the past 24 hrs:   BP Temp Temp src Pulse Resp SpO2   09/30/24 2030 116/64 98.6  F (37  C) Temporal 77 18 98 %   09/30/24 1753 -- -- -- (!) 121 20 98 %     Physical Exam  Eyes:  The pupils are equal and round    Conjunctivae and sclerae are normal  ENT:    The nose is normal    Pinnae are normal  CV:  Regular rate and rhythm     No edema  Resp:  Lungs are clear    Non-labored    No rales    No wheezing   GI:  Abdomen is soft and non-tender, there is no rigidity    No distension    No rebound tenderness   MS:  Normal muscular tone    No asymmetric leg swelling  Skin:  No rash or acute skin lesions noted  Neuro:   Awake, alert.      Speech is normal and fluent.    Face is symmetric.     Moves all extremities  Psych: Rambling speech. Endorses chronic SI. Denies acute SI/HI/Hallucinations      Diagnostics     Lab Results   Labs Ordered and Resulted from Time of ED Arrival to Time of ED Departure   COMPREHENSIVE METABOLIC PANEL - Abnormal       Result Value    Sodium 145      Potassium 4.1      Carbon Dioxide (CO2) 23      Anion Gap 14      Urea Nitrogen 19.0      Creatinine 1.27 (*)     GFR Estimate 72      Calcium 9.2      Chloride 108 (*)     Glucose 91      Alkaline Phosphatase 44      AST 69 (*)     ALT 30      Protein Total 6.7      Albumin 4.5      Bilirubin Total 0.6     ETHYL ALCOHOL LEVEL - Abnormal    Alcohol ethyl 0.16 (*)    CBC WITH PLATELETS - Normal    WBC Count 7.1      RBC Count 4.57      Hemoglobin 14.5      Hematocrit 42.3      MCV 93      MCH 31.7      MCHC 34.3      RDW 12.8      Platelet Count 176         Imaging   No orders to display       Independent Interpretation   None    ED Course      Medications Administered   Medications   lamoTRIgine (LaMICtal) 24 hr tablet 300 mg (has no administration in time range)   escitalopram (LEXAPRO) tablet 20 mg (has no administration in time  range)   lamoTRIgine (LaMICtal) 24 hr tablet 300 mg (300 mg Oral $Given 9/30/24 2026)   ibuprofen (ADVIL/MOTRIN) tablet 600 mg (600 mg Oral $Given 9/30/24 2026)       Procedures   Procedures     Discussion of Management   None    ED Course   ED Course as of 09/30/24 2037   Mon Sep 30, 2024   2598 I obtained history and examined the patient as noted above         Additional Documentation  None    Medical Decision Making / Diagnosis     CMS Diagnoses: None    MIPS       None    MDM   Fran Lowery is a 43 year old male who presents to the emergency department with concerns about suicidal ideation.  He notes that he has not been using his lamotrigine for the past 2 or 3 days.  He says periodically he goes into episodes where he is very depressed.  He has had suicidal thoughts but notes that these have been present since he was 8.  He denies any intention of acting on them.  He does note that he was also drinking some alcohol today.  He is given a dose of his lamotrigine.  Basic laboratory workup was initiated.    After period of observation he became clinically sober.  Vital signs improved and patient was recommended to go to the empath unit.  [facility] would like to evaluate patient in the emergency department prior to excepting him over in the empath unit.  Patient signed out to my partner awaiting evaluation by mental health.    Disposition   Pending    Diagnosis     ICD-10-CM    1. Suicidal ideation  R45.851            Discharge Medications   New Prescriptions    No medications on file         Scribe Disclosure:  I, Kushal Cortez, am serving as a scribe at 6:25 PM on 9/30/2024 to document services personally performed by Pritesh Polo MD based on my observations and the provider's statements to me.        Pritesh Polo MD  10/01/24 0016

## 2024-09-30 NOTE — ED TRIAGE NOTES
Presents in a wheelchair with security, patient was found by the cafeteria by pharmacy who stated patient said he could no longer walk due to back pain. Security found patient drinking alcohol from a juice bottle.    Patient states suicidal ideations and that his  sent him here and he is on commitment.

## 2024-10-01 VITALS
RESPIRATION RATE: 16 BRPM | TEMPERATURE: 97 F | HEIGHT: 74 IN | WEIGHT: 276 LBS | SYSTOLIC BLOOD PRESSURE: 101 MMHG | DIASTOLIC BLOOD PRESSURE: 65 MMHG | OXYGEN SATURATION: 96 % | HEART RATE: 55 BPM | BODY MASS INDEX: 35.42 KG/M2

## 2024-10-01 PROBLEM — R45.851 SUICIDAL IDEATION: Status: ACTIVE | Noted: 2021-11-17

## 2024-10-01 PROBLEM — F31.10 BIPOLAR AFFECTIVE DISORDER, CURRENT EPISODE MANIC, CURRENT EPISODE SEVERITY UNSPECIFIED (H): Status: ACTIVE | Noted: 2024-10-01

## 2024-10-01 PROBLEM — F19.94 SUBSTANCE INDUCED MOOD DISORDER (H): Status: ACTIVE | Noted: 2024-10-01

## 2024-10-01 PROBLEM — F41.9 ANXIETY: Status: ACTIVE | Noted: 2024-10-01

## 2024-10-01 PROBLEM — F10.20 ALCOHOL USE DISORDER, MODERATE, DEPENDENCE (H): Status: ACTIVE | Noted: 2024-10-01

## 2024-10-01 PROBLEM — F39 MOOD DISORDER (H): Status: ACTIVE | Noted: 2024-10-01

## 2024-10-01 PROBLEM — F15.90 STIMULANT USE DISORDER: Status: ACTIVE | Noted: 2024-10-01

## 2024-10-01 LAB
AMPHETAMINES UR QL SCN: ABNORMAL
BARBITURATES UR QL SCN: ABNORMAL
BENZODIAZ UR QL SCN: ABNORMAL
BZE UR QL SCN: ABNORMAL
CANNABINOIDS UR QL SCN: ABNORMAL
FENTANYL UR QL: ABNORMAL
OPIATES UR QL SCN: ABNORMAL
PCP QUAL URINE (ROCHE): ABNORMAL

## 2024-10-01 PROCEDURE — 99223 1ST HOSP IP/OBS HIGH 75: CPT | Performed by: PSYCHIATRY & NEUROLOGY

## 2024-10-01 PROCEDURE — 250N000013 HC RX MED GY IP 250 OP 250 PS 637: Performed by: EMERGENCY MEDICINE

## 2024-10-01 PROCEDURE — 80307 DRUG TEST PRSMV CHEM ANLYZR: CPT | Performed by: PSYCHIATRY & NEUROLOGY

## 2024-10-01 PROCEDURE — G0378 HOSPITAL OBSERVATION PER HR: HCPCS

## 2024-10-01 PROCEDURE — 250N000013 HC RX MED GY IP 250 OP 250 PS 637: Performed by: NURSE PRACTITIONER

## 2024-10-01 PROCEDURE — 250N000013 HC RX MED GY IP 250 OP 250 PS 637: Performed by: PSYCHIATRY & NEUROLOGY

## 2024-10-01 RX ORDER — OLANZAPINE 5 MG/1
5 TABLET, ORALLY DISINTEGRATING ORAL
Status: DISCONTINUED | OUTPATIENT
Start: 2024-10-01 | End: 2024-10-02 | Stop reason: HOSPADM

## 2024-10-01 RX ORDER — LAMOTRIGINE 150 MG/1
2 TABLET ORAL
COMMUNITY
Start: 2024-08-13 | End: 2024-10-01

## 2024-10-01 RX ORDER — ESCITALOPRAM OXALATE 20 MG/1
20 TABLET ORAL DAILY
COMMUNITY
Start: 2024-09-23

## 2024-10-01 RX ORDER — LAMOTRIGINE 150 MG/1
300 TABLET ORAL DAILY
COMMUNITY

## 2024-10-01 RX ORDER — IBUPROFEN 600 MG/1
600 TABLET, FILM COATED ORAL EVERY 6 HOURS PRN
Status: DISCONTINUED | OUTPATIENT
Start: 2024-10-01 | End: 2024-10-02 | Stop reason: HOSPADM

## 2024-10-01 RX ORDER — OLANZAPINE 5 MG/1
5 TABLET, ORALLY DISINTEGRATING ORAL ONCE
Status: COMPLETED | OUTPATIENT
Start: 2024-10-01 | End: 2024-10-01

## 2024-10-01 RX ADMIN — ESCITALOPRAM OXALATE 20 MG: 20 TABLET, FILM COATED ORAL at 11:32

## 2024-10-01 RX ADMIN — OLANZAPINE 5 MG: 5 TABLET, ORALLY DISINTEGRATING ORAL at 02:11

## 2024-10-01 RX ADMIN — LAMOTRIGINE EXTENDED-RELEASE 300 MG: 100 TABLET, FILM COATED, EXTENDED RELEASE ORAL at 13:20

## 2024-10-01 RX ADMIN — IBUPROFEN 600 MG: 600 TABLET ORAL at 17:20

## 2024-10-01 ASSESSMENT — ACTIVITIES OF DAILY LIVING (ADL)
ADLS_ACUITY_SCORE: 38

## 2024-10-01 NOTE — PROGRESS NOTES
"Pt presented to the ED at the advice of his . Pt is hyper-verbal, disorganized, and smells of alcohol. He reports having a period of sobriety, but has been drinking ETOH and smoking THC daily for the past 15 days, and he used Meth last week. He reports he has not been sleeping, and has suicidal thoughts \"all the time.\" \"If I was ever going to do anything I would have done it by now.\" He reports having DWI'S, being in [facility], being in and out of treatment, and in and our of Mental health stays. He reports he has not had any medications for the past two days, he is unable to track conversation long enough to give me the names and doses of his medications. He denies HI or hallucinations. He feels he is manic, and he presents manic. He is cooperative and agreed he that he could be appropriate on the unit. Search was complete, his belongings were placed in a locker. Routine and tour of unit given. He ate snacks and is hoping to sleep. He was provided a Sensory room, he received a dose of Zyprexa in the ED.   "

## 2024-10-01 NOTE — ED NOTES
[facility]  ED to EMPATH Checklist:      Goal for EMPATH: Substance use, Suicidality, Medication management, and Time to stabilize    Current Behavior: Withdrawn, Calm, and Cooperative    Safety Concerns: Suicidal, no plan and Intoxicated as evidenced by labs    Legal Hold Status: Voluntary    Medically Cleared by ED provider: Yes and Vital signs stable    Patient Therapeutically Searched: Security wanded    Belongings: In room locker    Independent Ambulation at Baseline: Yes/No: Yes    Participates in Care/Conversation: Yes/No: Yes    Patient Informed about EMPATH: Yes/No: Yes    DEC: Ordered and pending    Patient Ready to be Transferred to EMPATH? Yes/No: Yes

## 2024-10-01 NOTE — ED NOTES
Pt still sleeping. He is aware that he needs to give a urine sample. Container at bedside. No symptaaoms of ETOH withdrawal.

## 2024-10-01 NOTE — ED PROVIDER NOTES
EmPATH Unit - Initial Psychiatric Observation Note  [facility] Emergency Department  Observation Initiation Date: Sep 30, 2024    Fran Lowery MRN: [id number]   Age: 43 year old YOB: 1980     History     Chief Complaint   Patient presents with    Suicidal     HPI  Fran Lowery is a 43 year old male with a past history notable for a mood disorder further complicated by substance use involving alcohol, methamphetamine, and cannabis.  Documentation indicates that he presented to the emergency department reporting heightened anxiety, depressed mood, and suicidal ideation, having missed his medications for 2 days.  He reported recent usage of alcohol.  He was determined to be medically stable and transferred to the EmPATH unit for psychiatric assessment.  He is now approaching 20 hours in the emergency department.  On examination, the patient interprets trying numerous psychotropic medications in the past and gaining a fair response to his current medications which include Lexapro 20 mg daily and lamotrigine 300 mg daily.  He notices most benefit from lamotrigine which has helped to lessen the intensity of his mood lability.  He recalls a few days of heavy alcohol use leading to 2 days of medication nonadherence, suspecting these factors have led to brief decompensation in his mood.  He is ideally seeking to restart his medications in anticipation of regaining symptom stability in the next day or 2.  Suicidal thoughts have diminished in intensity, currently reporting passive thoughts without plan or intent.  He denied auditory or visual hallucinations.  There was no indication of homicidal ideation.  He does not anticipate experiencing alcohol withdrawal symptoms.    Past Medical History  No past medical history on file.  Past Surgical History:   Procedure Laterality Date    [facility] NONSPECIFIC PROCEDURE  1999    repair of torn tendon in rt wrist     escitalopram (LEXAPRO) 20 MG  "tablet  lamoTRIgine (LAMICTAL) 150 MG tablet  ARIPiprazole (ABILIFY) 2 MG tablet  Cholecalciferol (VITAMIN D3) 50 MCG (2000 UT) CAPS  escitalopram (LEXAPRO) 10 MG tablet  lurasidone (LATUDA) 40 MG TABS tablet  naltrexone (DEPADE/REVIA) 50 MG tablet  terbinafine (LAMISIL) 250 MG tablet  traZODone (DESYREL) 50 MG tablet      No Known Allergies  Family History  Family History   Problem Relation Age of Onset    Hypertension Mother     Cerebrovascular Disease Mother     Alcohol/Drug Mother     Depression Mother     Eye Disorder Mother     Obesity Mother     Psychotic Disorder Mother     Alcohol/Drug Father     Arthritis Father     Eye Disorder Father     Obesity Father     Alcohol/Drug Maternal Grandfather     Alcohol/Drug Paternal Grandfather      Social History   Social History     Tobacco Use    Smoking status: Never    Smokeless tobacco: Never   Substance Use Topics    Alcohol use: Yes     Comment: binge    Drug use: Not Currently     Comment: last use 1 week ago 2022          Review of Systems  A medically appropriate review of systems was performed with pertinent positives and negatives noted in the HPI, and all other systems negative.    Physical Examination   BP: 116/64  Pulse: (!) 121  Temp: 98.6  F (37  C)  Resp: 20  Height: 188 cm (6' 2\")  Weight: 125.2 kg (276 lb)  SpO2: 98 %    Physical Exam  General: Appears stated age.   Neuro: Alert and fully oriented. Extremities appear to demonstrate normal strength on visual inspection.   Integumentary/Skin: no rash visualized, normal color    Psychiatric Examination   Appearance: awake, alert  Attitude:  cooperative  Eye Contact:  fair  Mood:  anxious  Affect:  intensity is blunted  Speech:  clear, coherent  Psychomotor Behavior:  no evidence of tardive dyskinesia, dystonia, or tics  Thought Process:  logical and linear  Associations:  no loose associations  Thought Content:  no evidence of psychotic thought and passive suicidal ideation present  Insight:  " fair  Judgement:  fair  Oriented to:  time, person, and place  Attention Span and Concentration:  fair  Recent and Remote Memory:  fair  Language: able to name/identify objects without impairment  Fund of Knowledge: intact with awareness of current and past events    ED Course     ED Course as of 10/01/24 1239   Mon Sep 30, 2024   1749 I obtained history and examined the patient as noted above         Labs Ordered and Resulted from Time of ED Arrival to Time of ED Departure   COMPREHENSIVE METABOLIC PANEL - Abnormal       Result Value    Sodium 145      Potassium 4.1      Carbon Dioxide (CO2) 23      Anion Gap 14      Urea Nitrogen 19.0      Creatinine 1.27 (*)     GFR Estimate 72      Calcium 9.2      Chloride 108 (*)     Glucose 91      Alkaline Phosphatase 44      AST 69 (*)     ALT 30      Protein Total 6.7      Albumin 4.5      Bilirubin Total 0.6     ETHYL ALCOHOL LEVEL - Abnormal    Alcohol ethyl 0.16 (*)    CBC WITH PLATELETS - Normal    WBC Count 7.1      RBC Count 4.57      Hemoglobin 14.5      Hematocrit 42.3      MCV 93      MCH 31.7      MCHC 34.3      RDW 12.8      Platelet Count 176         Assessments & Plan (with Medical Decision Making)   Patient presenting with acute worsening of depressive symptoms and anxiety in the context of a few days of heavy alcohol use in 2 days of nonadherence with his medications.  Preceding this episode, he recalls maintaining symptom stability on his prior to admission medications.  He is ideally seeking to restart his medications in anticipation of regaining symptom reduction. Nursing notes reviewed noting no acute issues.     I have reviewed the assessment completed by the [facility].     During the observation period, the patient did not require medications for agitation, and did not require restraints/seclusion for patient and/or provider safety.     The patient was found to have a psychiatric condition that would benefit from an observation stay in the emergency  department for further psychiatric stabilization and/or coordination of a safe disposition. The observation plan includes serial assessments of psychiatric condition, potential administration of medications if indicated, further disposition pending the patient's psychiatric course during the monitoring period.     Preliminary diagnosis:    ICD-10-CM    1. Suicidal ideation  R45.851       2. Mood disorder (H)  F39     substance induced vs a primary mood disorder      3. Alcohol use disorder, moderate, dependence (H)  F10.20       4. Stimulant use disorder  F15.90       5. Anxiety  F41.9            Treatment Plan:  -Restart lamotrigine 300 mg daily for mood stabilization  -Restart Lexapro 20 mg daily for treatment of anxiety and depressive symptoms  -His reported usage of alcohol is accurate, the risk of a complicated withdrawal syndrome should be low.  Vital signs are stable today and he did not exhibit withdrawal symptoms on examination.  Continue to monitor and treat accordingly  -Offer a chemical health assessment to aid in exploring substance use disorder treatment options  -Urine drug screen has been ordered  -Enter to observation status and reassess tomorrow    --  Hector Mcallister MD   [facility] EMERGENCY DEPT  EmPATH Unit       Hector Mcallister MD  10/01/24 2521

## 2024-10-01 NOTE — ED NOTES
Flat affect depressed. Denies SI currently. States he is very tired and needs to sleep. No evidence of withdrawal and no symptoms of mary. States he is depressed
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
## 2024-10-01 NOTE — PLAN OF CARE
Fran S [place]andy  October 1, 2024  Plan of Care Hand-off Note     Patient Care Path: observation    Plan for Care:   A lower level of care has been unsuccessful in treating and stabilizing patient s mental health symptoms. Patient will remain on EmPATH unit under observation for continued monitoring, treatment and therapeutic intervention of mental health symptoms. Observation at [facility] could help mitigate the need for a more restrictive level of care in an inpatient setting.    Identified Goals and Safety Issues: decrease symptoms, engage in safety planning, collaborate with his CM regarding disposition    Overview:  Norma Schilling, mother, [phone] Shahrzad Voss CM, [phone]       Legal Status: Legal Status at Admission: Commitment (pt is currently on a commitment through [facility])    Psychiatry Consult: pt will meet with psychiatry provider while on EmPATH       Updated Dr. Wood and RN regarding plan of care.           Magdalene Gómez, LPCC, LADC         350

## 2024-10-01 NOTE — PROGRESS NOTES
Triage & Transition Services, Extended Care     Client Name: Fran Lowery    Date: October 1, 2024    Service Type: did not attend group session due to sleeping in recliner  Session Start Time:  (P) 1015    Session End Time: (P) 1020  Session Length: (P) 5  Site Location: [facility] EMERGENCY DEPT                             [id number]  Total Number ofAttendees: (P) 0    LINO Montano, St[facility] Regional Medical CenterSW  Licensed Mental Health Professional (LMHP)  EmPATH, [phone]

## 2024-10-02 ENCOUNTER — HOSPITAL ENCOUNTER (EMERGENCY)
Facility: CLINIC | Age: 44
Discharge: HOME | End: 2024-10-03
Attending: EMERGENCY MEDICINE | Admitting: EMERGENCY MEDICINE
Payer: COMMERCIAL

## 2024-10-02 VITALS
RESPIRATION RATE: 20 BRPM | TEMPERATURE: 97.4 F | HEART RATE: 105 BPM | SYSTOLIC BLOOD PRESSURE: 178 MMHG | OXYGEN SATURATION: 95 % | DIASTOLIC BLOOD PRESSURE: 101 MMHG

## 2024-10-02 DIAGNOSIS — F19.10 POLYSUBSTANCE ABUSE (H): ICD-10-CM

## 2024-10-02 DIAGNOSIS — R45.851 DEPRESSION WITH SUICIDAL IDEATION: ICD-10-CM

## 2024-10-02 DIAGNOSIS — F32.A DEPRESSION WITH SUICIDAL IDEATION: ICD-10-CM

## 2024-10-02 PROCEDURE — 99282 EMERGENCY DEPT VISIT SF MDM: CPT

## 2024-10-02 PROCEDURE — G0378 HOSPITAL OBSERVATION PER HR: HCPCS

## 2024-10-02 ASSESSMENT — COLUMBIA-SUICIDE SEVERITY RATING SCALE - C-SSRS
5. HAVE YOU STARTED TO WORK OUT OR WORKED OUT THE DETAILS OF HOW TO KILL YOURSELF? DO YOU INTEND TO CARRY OUT THIS PLAN?: NO
3. HAVE YOU BEEN THINKING ABOUT HOW YOU MIGHT KILL YOURSELF?: YES
6. HAVE YOU EVER DONE ANYTHING, STARTED TO DO ANYTHING, OR PREPARED TO DO ANYTHING TO END YOUR LIFE?: NO
TOTAL  NUMBER OF INTERRUPTED ATTEMPTS SINCE LAST CONTACT: NO
SUICIDE, SINCE LAST CONTACT: NO
4. HAVE YOU HAD THESE THOUGHTS AND HAD SOME INTENTION OF ACTING ON THEM?: YES
2. HAVE YOU ACTUALLY HAD ANY THOUGHTS OF KILLING YOURSELF?: NO
ATTEMPT SINCE LAST CONTACT: NO
1. IN THE PAST MONTH, HAVE YOU WISHED YOU WERE DEAD OR WISHED YOU COULD GO TO SLEEP AND NOT WAKE UP?: YES
1. SINCE LAST CONTACT, HAVE YOU WISHED YOU WERE DEAD OR WISHED YOU COULD GO TO SLEEP AND NOT WAKE UP?: NO
2. HAVE YOU ACTUALLY HAD ANY THOUGHTS OF KILLING YOURSELF IN THE PAST MONTH?: YES
6. HAVE YOU EVER DONE ANYTHING, STARTED TO DO ANYTHING, OR PREPARED TO DO ANYTHING TO END YOUR LIFE?: YES
TOTAL  NUMBER OF ABORTED OR SELF INTERRUPTED ATTEMPTS SINCE LAST CONTACT: NO

## 2024-10-02 ASSESSMENT — ACTIVITIES OF DAILY LIVING (ADL)
ADLS_ACUITY_SCORE: 38

## 2024-10-02 NOTE — DISCHARGE INSTRUCTIONS
"  Aftercare Plan  If I am feeling unsafe or I am in a crisis, I will:   Contact my established care providers   Call the National Suicide Prevention Lifeline: 988  Go to the nearest emergency room   Call 311     Continue working with your established mental health supports -     Please schedule and attend therapy and psychiatry follow up as you indicated your  will assist with that.  If you are not able to schedule, please call us back so we can get you set up with after care providers.     Refrain from substance use as you are starting your recovery journey.      [facility] Crisis Lines  [facility] Crisis Line Number: [phone]  [facility] Crisis Line Number: [phone]   [facility] Crisis Line Number: [phone]   The mental health crisis line for [place], [place] is [phone]. This line is available 24 hours a day, 7 days a week, including holidays. You can also text \"[place]\" to [phone].    Additional Information  Today you were seen by a licensed mental health professional through Triage and Transition services, [facility] ([facility])  for a crisis assessment in the Emergency Department at [facility].  It is recommended that you follow up with your established providers (psychiatrist, mental health therapist, and/or primary care doctor - as relevant) as soon as possible. Coordinators from [facility] will be calling you in the next 24-48 hours to ensure that you have the resources you need.  You can also contact [facility] coordinators directly at [phone]. You may have been scheduled for or offered an appointment with a mental health provider. [facility] maintains an extensive network of licensed behavioral health providers to connect patients with the services they need.  We do not charge providers a fee to participate in our referral network.  We match patients with providers based on a patient's specific needs, insurance coverage, and location.  Our " first effort will be to refer you to a provider within your care system, and will utilize providers outside your care system as needed.

## 2024-10-02 NOTE — PROGRESS NOTES
Discharge instructions reviewed with patient including follow-up care plan. Educated on medication regimen and advised not to stop prescribed medication without consulting their physician. Reviewed safety plan and outpatient resources. Pt denies active SI, has contracted for safety and completed safety plan with [facility].  All belongings which were brought into the hospital have been returned to patient. Escorted off the unit at  8:33am accompanied by psych associate.  Discharged to street in stable condition via walkout.

## 2024-10-02 NOTE — ED NOTES
Anesthesia Pre Eval Note    Anesthesia ROS/Med Hx    Overall Review:  EKG was reviewed and Stress test was reviewed     Anesthetic Complication History:    History of postoperative nausea & vomiting    Pulmonary Review:  Patient does not have a pulmonary history      Neuro/Psych Review:   Comments: TIA 10 yrs ago, no further c/o      Positive for TIA (2008 no residual)  Positive for psychiatric history - Bipolar    Cardiovascular Review:   Comments: Treated for pericarditis end of 2022  Exercise tolerance: good (>4 METS)  Positive for hypertension  Positive for hyperlipidemia    GI/HEPATIC/RENAL Review:  Patient does not have a GI/hepatic/renalhistory       End/Other Review:    Positive for hypothyroidism  Positive for arthritis    Overall Review of Systems Comments:  Normal sinus rhythm   Minimal voltage criteria for LVH, may be normal variant   Borderline ECG   When compared with ECG of 24-DEC-2022 19:02,   No significant change was found   Negative STEMI   Confirmed by DANNYTERRELL NICOLE MD ([id number]),  IRMA VILLASEÑOR RRT ([id number]) on 3/26/2023    Stress 2022  CONCLUSIONS   1. Exercise tolerance was not tested. This is a pharmacologic stress   study.   2. ECG response is nonspecific. See details above.   3. Perfusion shows no regional ischemia or scarring but is suggestive in   the short axis view of transient ischemic dilation (TID), which suggests   multivessel disease with balanced ischemia.   4. Left ventricular (LV) function was preserved.   5. Ejection fraction (EF) 58%.   6. Abnormal study for suspicion of TID. See details above.       Additional Results:      ALLERGIES:  No Known Allergies   Past Medical History:  No date: Depressed  No date: Essential (primary) hypertension  01/01/2003: History of skin cancer      Comment:  face under left eye  No date: Hyperlipidemia  No date: Hypothyroidism  No date: PONV (postoperative nausea and vomiting)      Comment:  following R. shoulder procedure  No date: TIA (transient  Pt spent time resting on the recliner, was up for dinner and to watch tv. Pt c/o back pain, he requested and received PRN ibuprofen. Pt provided urine sample for Utox order. Pt ate dinner then rested. Pt presents calm in mood with a blunted affect.    ischemic attack)  No date: Wears eyeglasses  Past Surgical History:  No date: Ankle surgery  No date: Back surgery  10/2017: Cataract extraction w/ intraocular lens implant; Left      Comment:  Dr. Khoury  No date: Colonoscopy  No date: Esophagogastroduodenoscopy, flexible, transoral; w/endo   No date: Gallbladder surgery  02/08/2022: Joint replacement; Right      Comment:  Right Total Knee Arthroplasty  No date: Shoulder surg proc unlisted; Right      Comment:  2015 or 2016  No date: Skin cancer excision  No date: Tonsillectomy  04/06/2023: Total hip replacement; Right      Relevant Problems   No relevant active problems       Physical Exam     Airway   Mallampati: III  TM Distance: >3 FB  Neck ROM: Limited    Cardiovascular    Cardio Rhythm: Regular  Cardio Rate: Normal    Head Assessment  Head assessment: Atraumatic    General Assessment  General Assessment: Alert and oriented    Dental Exam  Dental exam normal    Pulmonary Exam    Breath sounds clear to auscultation:  Yes    Abdominal Exam  Abdominal exam normal      Anesthesia Plan:    ASA Status: 3  Anesthesia Type: Spinal    Maintenance: TIVA  Premedication: None      Post-op Pain Management: Per Surgeon and Single Shot Block      Checklist  Reviewed: Lab Results, Patient Summary, Care Everywhere, Allergies, Past Med History, Anesthesia Record, Medications, Beta Blocker Status, Nursing Notes, EKG, Consultations, Outside Records, NPO Status and Problem list  Consent/Risks Discussed Statement:  The proposed anesthetic plan, including its risks and benefits, have been discussed with the Patient, Daughter and Other Family Member along with the risks and benefits of alternatives. Questions were encouraged and answered and the patient and/or representative understands and agrees to proceed.        I discussed with the patient (and/or patient's legal representative) the risks and benefits of the proposed anesthesia plan, Spinal, which may include services performed  by other anesthesia providers.    Alternative anesthesia plans, if available, were reviewed with the patient (and/or patient's legal representative). Discussion has been held with the patient (and/or patient's legal representative) regarding risks of anesthesia, which include  emergent situations that may require change in anesthesia plan.    The patient (and/or patient's legal representative) has indicated understanding, his/her questions have been answered, and he/she wishes to proceed with the planned anesthetic.

## 2024-10-02 NOTE — PROGRESS NOTES
Pt woke and is Reading. He is calm with no complaints, reports he slept all day. Wanting to shower in the morning.

## 2024-10-03 ENCOUNTER — PATIENT OUTREACH (OUTPATIENT)
Dept: CARE COORDINATION | Facility: CLINIC | Age: 44
End: 2024-10-03
Payer: COMMERCIAL

## 2024-10-03 ASSESSMENT — ACTIVITIES OF DAILY LIVING (ADL)
ADLS_ACUITY_SCORE: 38

## 2024-10-03 NOTE — ED TRIAGE NOTES
"  Pt presents with \"mental health concerns\" per pt. Pt states that mental health is dragging him down. Suicidal without plan. Pt discharge from [facility] this morning. Pt spoke with  and was told to come back.    Triage Assessment (Adult)       Row Name 10/02/24 4746          Triage Assessment    Airway WDL WDL        Respiratory WDL    Respiratory WDL WDL        Skin Circulation/Temperature WDL    Skin Circulation/Temperature WDL WDL        Cardiac WDL    Cardiac WDL WDL        Peripheral/Neurovascular WDL    Peripheral Neurovascular WDL WDL                     "

## 2024-10-03 NOTE — PROGRESS NOTES
[facility]    Background: Transitional Care Management program identified per system criteria and reviewed by [facility] team for possible outreach.    Assessment: Upon chart review, [facility] Team member will not proceed with patient outreach related to this episode of Transitional Care Management program due to reason below:    Patient does not have contact number in the chart    Plan: Transitional Care Management episode addressed appropriately per reason noted above.      LIANET Gregg  [facility]    *Connected Care Resource Team does NOT follow patient ongoing. Referrals are identified based on internal discharge reports and the outreach is to ensure patient has an understanding of their discharge instructions.

## 2024-10-03 NOTE — ED PROVIDER NOTES
"  Emergency Department Note      History of Present Illness     Chief Complaint   Depression      HPI   Fran Lowery is a 43 year old male [facility] emergency department questing evaluation in [facility] unit.  He states he is suicidal and he is \"very low\".  Patient states that he would not actually commit suicide but he is having thoughts of it all the time and he needs to get his mental health straight.  He states he relapsed on alcohol, marijuana and meth after leaving from the hospital this morning because his \"mental health is not right\".  Patient states he talked to his  tonight who felt he needed to come back to the hospital.  He states he is on commitment.  He contracts for safety here in the emergency department.    Independent Historian   None    Review of External Notes   Psychiatry note from yesterday    Past Medical History     Medical History and Problem List   No past medical history on file.    Medications   escitalopram (LEXAPRO) 20 MG tablet  lamoTRIgine (LAMICTAL) 150 MG tablet        Surgical History   Past Surgical History:   Procedure Laterality Date    ZZC NONSPECIFIC PROCEDURE  1999    repair of torn tendon in rt wrist       Physical Exam     Patient Vitals for the past 24 hrs:   BP Temp Temp src Pulse Resp SpO2   10/02/24 2233 (!) 178/101 97.4  F (36.3  C) Temporal 105 20 95 %     Physical Exam  General: Patient is alert and normal appearing.  HEENT: Head atraumatic    Eyes: pupils equal and reactive. Conjunctiva clear   Nares: patent   Oropharynx: no lesions, uvula midline, no palatal draping, normal voice, no trismus  Neck: Supple without lymphadenopathy, no meningismus  Chest: Heart regular rate and rhythm.   Lungs: Equal clear to auscultation with no wheeze or rales  Abdomen: Soft, non tender, nondistended, normal bowel sounds  Back: No costovertebral angle tenderness, no midline C, T or L spine tenderness  Neuro: Grossly nonfocal, normal speech, strength equal bilaterally, " CN 2-12 intact  Extremities: No deformities, equal radial and DP pulses. No clubbing, cyanosis.  No edema  Skin: Warm and dry with no rash.   Psychiatric: anxious appearing.  Suicidal ideation.  Contracts for safety    Diagnostics     Lab Results   Labs Ordered and Resulted from Time of ED Arrival to Time of ED Departure - No data to display    Imaging   No orders to display         Independent Interpretation   None    ED Course      Medications Administered   Medications - No data to display    Procedures   Procedures     Discussion of Management   None    ED Course    2300 patient seen and examined by me    Additional Documentation  Homelessness/Housing Insecurity, Legal Problems/Incarceration, and Stress/Adjustment Disorders    Medical Decision Making / Diagnosis     CMS Diagnoses: None    MIPS       None    MDM   Fran Lowery is a 43 year old male history of mood disorder, polysubstance use and suicidal ideation anxiety presents the emergency department with worsening anxiety.  He states that he is feeling very low and having very suicidal thoughts.  No specific plan at this time.  Contracts for safety here in the emergency department.  He feels he is relapsing on alcohol and drugs due to his mental health and depression and suicidal thoughts.  Patient was medically screened and cleared by me.  Contracts for safety.  Agreeable with evaluation in the [facility] unit.  Plan will be to transfer to [facility] unit with final disposition planning per [facility].    Disposition   The patient was transferred to [facility].     Diagnosis     ICD-10-CM    1. Depression with suicidal ideation  F32.A     R45.851       2. Polysubstance abuse (H)  F19.10            Discharge Medications   New Prescriptions    No medications on file         MD Carlie Chapin, Mallory Reed MD  10/02/24 3500

## 2024-10-03 NOTE — ED NOTES
"Patient was sitting in triage 3 waiting for [facility] to be ready to come and pick him up. Patient got increasingly agitated and required frequent redirection. Patient started to go through the supplies in triage 3. This staff member informed patient that the curtain needed to remain open at this time since he was going through materials or he would have to be moved back into the lobby to wait. Patient got up and stated, \" This is my room I am sick of waiting here. Why can't I just go to where I need to be\". Informed patient that he had to wait until [facility] was ready for him and that he was the next one to be taken over there. Patient demonstrated no evidence of learning at this time.  Patient was asked to go and sit down and patiently wait for [facility]. Attempted to call [facility] unit to get an ETA on arrival time. While on the phone with [facility], patient then started to come around the desk, posturing at staff and shouting. Stating things like \"You are a fucking dictator. Everything you do is a lie\". Patient would not leave from behind the desk. Security was called to triage. Patient then started to take his sunglasses off and gesture throw them in the face. Patient started to approach staff and posture. Duress alarm was pulled as patient was increasingly agitated and threatening staff. Security arrived in triage and patient got physically violent. Patient escorted out of triage as he was not on a hold.   "

## 2024-10-03 NOTE — ED NOTES
[facility]  ED to [facility] Checklist:      Goal for [facility]: Depression management    Current Behavior: Calm    Safety Concerns: None    Legal Hold Status: Voluntary    Medically Cleared by ED provider: Yes    Patient Therapeutically Searched: Not searched - Currently in triage    Belongings: Remain with patient    Independent Ambulation at Baseline: Yes/No: Yes    Participates in Care/Conversation: Yes/No: Yes    Patient Informed about [facility]: Yes/No: Yes    DEC: Ordered and pending    Patient Ready to be Transferred to [facility]? Yes/No: Yes

## 2024-10-04 ENCOUNTER — HOSPITAL ENCOUNTER (EMERGENCY)
Facility: CLINIC | Age: 44
Discharge: HOME | End: 2024-10-05
Attending: STUDENT IN AN ORGANIZED HEALTH CARE EDUCATION/TRAINING PROGRAM | Admitting: STUDENT IN AN ORGANIZED HEALTH CARE EDUCATION/TRAINING PROGRAM
Payer: COMMERCIAL

## 2024-10-04 DIAGNOSIS — F19.10 DRUG ABUSE (H): ICD-10-CM

## 2024-10-04 DIAGNOSIS — F10.10 ALCOHOL ABUSE: ICD-10-CM

## 2024-10-04 DIAGNOSIS — F30.9 MANIA (H): ICD-10-CM

## 2024-10-04 PROBLEM — F15.959 METHAMPHETAMINE-INDUCED PSYCHOTIC DISORDER (H): Status: ACTIVE | Noted: 2024-10-04

## 2024-10-04 LAB
ALBUMIN SERPL BCG-MCNC: 4.7 G/DL (ref 3.5–5.2)
ALP SERPL-CCNC: 51 U/L (ref 40–150)
ALT SERPL W P-5'-P-CCNC: 57 U/L (ref 0–70)
ANION GAP SERPL CALCULATED.3IONS-SCNC: 17 MMOL/L (ref 7–15)
AST SERPL W P-5'-P-CCNC: 118 U/L (ref 0–45)
BASOPHILS # BLD AUTO: 0.1 10E3/UL (ref 0–0.2)
BASOPHILS NFR BLD AUTO: 1 %
BILIRUB SERPL-MCNC: 1 MG/DL
BUN SERPL-MCNC: 17.6 MG/DL (ref 6–20)
CALCIUM SERPL-MCNC: 9.4 MG/DL (ref 8.8–10.4)
CHLORIDE SERPL-SCNC: 101 MMOL/L (ref 98–107)
CREAT SERPL-MCNC: 1.1 MG/DL (ref 0.67–1.17)
EGFRCR SERPLBLD CKD-EPI 2021: 85 ML/MIN/1.73M2
EOSINOPHIL # BLD AUTO: 0.2 10E3/UL (ref 0–0.7)
EOSINOPHIL NFR BLD AUTO: 2 %
ERYTHROCYTE [DISTWIDTH] IN BLOOD BY AUTOMATED COUNT: 12.9 % (ref 10–15)
ETHANOL SERPL-MCNC: 0.07 G/DL
GLUCOSE SERPL-MCNC: 106 MG/DL (ref 70–99)
HCO3 SERPL-SCNC: 22 MMOL/L (ref 22–29)
HCT VFR BLD AUTO: 43.1 % (ref 40–53)
HGB BLD-MCNC: 15.2 G/DL (ref 13.3–17.7)
IMM GRANULOCYTES # BLD: 0 10E3/UL
IMM GRANULOCYTES NFR BLD: 0 %
LYMPHOCYTES # BLD AUTO: 2.3 10E3/UL (ref 0.8–5.3)
LYMPHOCYTES NFR BLD AUTO: 28 %
MAGNESIUM SERPL-MCNC: 2.1 MG/DL (ref 1.7–2.3)
MCH RBC QN AUTO: 31.8 PG (ref 26.5–33)
MCHC RBC AUTO-ENTMCNC: 35.3 G/DL (ref 31.5–36.5)
MCV RBC AUTO: 90 FL (ref 78–100)
MONOCYTES # BLD AUTO: 0.7 10E3/UL (ref 0–1.3)
MONOCYTES NFR BLD AUTO: 9 %
NEUTROPHILS # BLD AUTO: 4.8 10E3/UL (ref 1.6–8.3)
NEUTROPHILS NFR BLD AUTO: 60 %
NRBC # BLD AUTO: 0 10E3/UL
NRBC BLD AUTO-RTO: 0 /100
PHOSPHATE SERPL-MCNC: 2.5 MG/DL (ref 2.5–4.5)
PLATELET # BLD AUTO: 189 10E3/UL (ref 150–450)
POTASSIUM SERPL-SCNC: 3.8 MMOL/L (ref 3.4–5.3)
PROT SERPL-MCNC: 7.1 G/DL (ref 6.4–8.3)
RBC # BLD AUTO: 4.78 10E6/UL (ref 4.4–5.9)
SODIUM SERPL-SCNC: 140 MMOL/L (ref 135–145)
WBC # BLD AUTO: 8 10E3/UL (ref 4–11)

## 2024-10-04 PROCEDURE — 83735 ASSAY OF MAGNESIUM: CPT | Performed by: STUDENT IN AN ORGANIZED HEALTH CARE EDUCATION/TRAINING PROGRAM

## 2024-10-04 PROCEDURE — 36415 COLL VENOUS BLD VENIPUNCTURE: CPT | Performed by: STUDENT IN AN ORGANIZED HEALTH CARE EDUCATION/TRAINING PROGRAM

## 2024-10-04 PROCEDURE — 85025 COMPLETE CBC W/AUTO DIFF WBC: CPT | Performed by: STUDENT IN AN ORGANIZED HEALTH CARE EDUCATION/TRAINING PROGRAM

## 2024-10-04 PROCEDURE — 250N000013 HC RX MED GY IP 250 OP 250 PS 637: Performed by: STUDENT IN AN ORGANIZED HEALTH CARE EDUCATION/TRAINING PROGRAM

## 2024-10-04 PROCEDURE — 80053 COMPREHEN METABOLIC PANEL: CPT | Performed by: STUDENT IN AN ORGANIZED HEALTH CARE EDUCATION/TRAINING PROGRAM

## 2024-10-04 PROCEDURE — 82077 ASSAY SPEC XCP UR&BREATH IA: CPT | Performed by: STUDENT IN AN ORGANIZED HEALTH CARE EDUCATION/TRAINING PROGRAM

## 2024-10-04 PROCEDURE — 84100 ASSAY OF PHOSPHORUS: CPT | Performed by: STUDENT IN AN ORGANIZED HEALTH CARE EDUCATION/TRAINING PROGRAM

## 2024-10-04 PROCEDURE — 99285 EMERGENCY DEPT VISIT HI MDM: CPT

## 2024-10-04 PROCEDURE — 80175 DRUG SCREEN QUAN LAMOTRIGINE: CPT | Performed by: STUDENT IN AN ORGANIZED HEALTH CARE EDUCATION/TRAINING PROGRAM

## 2024-10-04 RX ORDER — IBUPROFEN 600 MG/1
600 TABLET, FILM COATED ORAL ONCE
Status: COMPLETED | OUTPATIENT
Start: 2024-10-04 | End: 2024-10-04

## 2024-10-04 RX ORDER — OLANZAPINE 10 MG/1
10 TABLET, ORALLY DISINTEGRATING ORAL ONCE
Status: COMPLETED | OUTPATIENT
Start: 2024-10-04 | End: 2024-10-04

## 2024-10-04 RX ADMIN — IBUPROFEN 600 MG: 600 TABLET ORAL at 19:56

## 2024-10-04 RX ADMIN — OLANZAPINE 10 MG: 10 TABLET, ORALLY DISINTEGRATING ORAL at 19:55

## 2024-10-04 ASSESSMENT — LIFESTYLE VARIABLES
TREMOR: NO TREMOR
NAUSEA AND VOMITING: NO NAUSEA AND NO VOMITING
ORIENTATION AND CLOUDING OF SENSORIUM: ORIENTED AND CAN DO SERIAL ADDITIONS
TOTAL SCORE: 3
AGITATION: NORMAL ACTIVITY
AUDITORY DISTURBANCES: NOT PRESENT
VISUAL DISTURBANCES: NOT PRESENT
HEADACHE, FULLNESS IN HEAD: NOT PRESENT
PAROXYSMAL SWEATS: NO SWEAT VISIBLE
ANXIETY: 3

## 2024-10-04 ASSESSMENT — ACTIVITIES OF DAILY LIVING (ADL)
ADLS_ACUITY_SCORE: 38

## 2024-10-04 NOTE — ED TRIAGE NOTES
"Pt reports having many mental health issues, and says he is on commitment. Pt states that when he is sober he needs help. Pt states he is interested in going to [facility]. Pt reports he eloped from a hospital recently when they had put him onto a 72.     Pt states \"needs to recover\",     States he used meth \"for only a little bit\".     Pt very manic in triage. Making statements \"I met the man that is getting paid $30,000 to kill me\", \"I've met god 16 years ago and he told me what to call my book that I am going to write.\"    "

## 2024-10-04 NOTE — ED PROVIDER NOTES
"  Emergency Department Note      History of Present Illness     Chief Complaint   Mental Health Problem    HPI   Fran Lowery is a 43 year old male with history of antisocial personality disorder, alcohol use disorder, and bipolar disorder 2 who presents to the ED for evaluation of a mental health problem. Fran was at [facility] yesterday where he was seen for mental health concerns, he left because he disliked the detox facility that they offered him. He states that he is on a estrada and is spiraling, he notes spiraling to be the cause of many issues in his life. Fran repots having drank an entire 1.75 bottle of hard liquor, multiple large and high alcohol content beers, and meth usage. He reports that he has had passing thoughts of killing himself four times before being seen. He denies active SI. Fran reportedly has \"seen the depths of hell and is heartbroken.\"     Independent Historian   None    Review of External Notes   I reviewed the note from [facility] ED on 10/03 where he saw their mental health team, he wanted to go to detox but became upset when he was told which detox had a bed available. The patient was not on a 72 hour hold.    Past Medical History     Medical History and Problem List   Anxiety   ADHD  Astigmatism   Acetabular   Antisocial personality disorder   Alcohol use disorder   Bipolar disorder 2   Cannabis abuse disorder   Cocaine abuse   Caries   Gum disease   SI  Stimulant use disorder   Methamphetamine use disorder  MDD   Male infertility   Presbyopia  PTSD      Medications   Lexapro   Lamictal     Surgical History   Right tendon repair   Sturgeon teeth extraction   Musculoskeletal procedure   Knee arthroscopy   Shoulder arthroscopy     Physical Exam     Patient Vitals for the past 24 hrs:   BP Temp Temp src Pulse Resp SpO2 Height Weight   10/05/24 0038 119/60 -- -- 76 18 92 % -- --   10/04/24 2012 (!) 152/91 98.9  F (37.2  C) Oral 104 -- 95 % 1.88 m (6' 2\") 124.7 kg (275 lb) "   10/04/24 1750 (!) 155/101 98.3  F (36.8  C) Temporal 108 18 96 % -- --     Physical Exam  General: Awake, alert, in no acute distress   HEENT: Atraumatic   EOM normal   External ears normal   Trachea midline  Neck: Supple, normal ROM  CV: Regular rate, regular rhythm   No lower extremity edema  2+ radial and DP pulses  PULM: Breath sounds normal bilaterally  No wheezes or rales  ABD: Soft, non-tender, non-distended  Normal bowel sounds   No rebound or guarding   MSK: No gross deformities  NEURO: Alert, no focal deficits.  Cranial nerves II through XII intact.  Walking with steady gait.  Moving all 4 extremities.  Skin: Warm, dry and intact  Psych: Pressured speech, denies SI HI.  Participatory with exam      Diagnostics     Lab Results   Labs Ordered and Resulted from Time of ED Arrival to Time of ED Departure   COMPREHENSIVE METABOLIC PANEL - Abnormal       Result Value    Sodium 140      Potassium 3.8      Carbon Dioxide (CO2) 22      Anion Gap 17 (*)     Urea Nitrogen 17.6      Creatinine 1.10      GFR Estimate 85      Calcium 9.4      Chloride 101      Glucose 106 (*)     Alkaline Phosphatase 51       (*)     ALT 57      Protein Total 7.1      Albumin 4.7      Bilirubin Total 1.0     ETHYL ALCOHOL LEVEL - Abnormal    Alcohol ethyl 0.07 (*)    MAGNESIUM - Normal    Magnesium 2.1     PHOSPHORUS - Normal    Phosphorus 2.5     CBC WITH PLATELETS AND DIFFERENTIAL    WBC Count 8.0      RBC Count 4.78      Hemoglobin 15.2      Hematocrit 43.1      MCV 90      MCH 31.8      MCHC 35.3      RDW 12.9      Platelet Count 189      % Neutrophils 60      % Lymphocytes 28      % Monocytes 9      % Eosinophils 2      % Basophils 1      % Immature Granulocytes 0      NRBCs per 100 WBC 0      Absolute Neutrophils 4.8      Absolute Lymphocytes 2.3      Absolute Monocytes 0.7      Absolute Eosinophils 0.2      Absolute Basophils 0.1      Absolute Immature Granulocytes 0.0      Absolute NRBCs 0.0     LAMOTRIGINE LEVEL        Imaging   No orders to display     Independent Interpretation   None    ED Course      Medications Administered   Medications   ibuprofen (ADVIL/MOTRIN) tablet 600 mg (600 mg Oral $Given 10/4/24 1956)   OLANZapine zydis (zyPREXA) ODT tab 10 mg (10 mg Oral $Given 10/4/24 1955)     Procedures   Procedures     Discussion of Management   None    ED Course   ED Course as of 10/05/24 0123   Fri Oct 04, 2024   1806 I obtained history and examined the patient as noted above.    1921 I spoke with DEC regarding the patient's current status.   Sat Oct 05, 2024   0039 Reassessment. Pt sleeping, wakes easily to voice. Declines detox.     Additional Documentation  None    Medical Decision Making / Diagnosis     CMS Diagnoses: None    MIPS       None    MDM   Fran Lowery is a 43 year old male who presents with the above history.  He has had multiple ED visits in the last 1 week.  He is reporting significant mental health decline, overuse of alcohol and methamphetamine not using his Lamictal.  Received Zyprexa significant improvement in his pressured speech.  DEC evaluated and did not find he required inpatient mental health treatment.  Screening labs were ordered as patient initially was reporting desire to want to go to detox.  Later he stated he wanted to go to [facility] but transfer based on DEC assessment.  Ultimately he chose against detox.  Is safety planning.  Declines my offer for prescriptions, other assistance.  Is tolerating p.o., ambulatory about the department.  Discussed plan for discharge with patient, provided bus tokens.    Disposition   The patient was discharged.     Diagnosis     ICD-10-CM    1. Drug abuse (H)  F19.10       2. Alcohol abuse  F10.10       3. Vikik (H)  F30.9            RIGOBERTO, Kelsey Harris, am serving as a scribe at 6:00 PM on 10/4/2024 to document services personally performed by Barb Coy,  based on my observations and the provider's statements to me.        Cinthya  Barb Rivera,   10/05/24 0123

## 2024-10-05 ENCOUNTER — TELEPHONE (OUTPATIENT)
Dept: BEHAVIORAL HEALTH | Facility: CLINIC | Age: 44
End: 2024-10-05
Payer: COMMERCIAL

## 2024-10-05 ENCOUNTER — HOSPITAL ENCOUNTER (EMERGENCY)
Facility: CLINIC | Age: 44
Discharge: HOME | End: 2024-10-06
Attending: EMERGENCY MEDICINE | Admitting: EMERGENCY MEDICINE
Payer: COMMERCIAL

## 2024-10-05 ENCOUNTER — HOSPITAL ENCOUNTER (EMERGENCY)
Facility: CLINIC | Age: 44
Discharge: HOME | End: 2024-10-05
Attending: EMERGENCY MEDICINE | Admitting: EMERGENCY MEDICINE
Payer: COMMERCIAL

## 2024-10-05 ENCOUNTER — TELEPHONE (OUTPATIENT)
Dept: BEHAVIORAL HEALTH | Facility: CLINIC | Age: 44
End: 2024-10-05

## 2024-10-05 VITALS
OXYGEN SATURATION: 92 % | RESPIRATION RATE: 18 BRPM | WEIGHT: 275 LBS | HEIGHT: 74 IN | TEMPERATURE: 98.9 F | BODY MASS INDEX: 35.29 KG/M2 | SYSTOLIC BLOOD PRESSURE: 119 MMHG | HEART RATE: 76 BPM | DIASTOLIC BLOOD PRESSURE: 60 MMHG

## 2024-10-05 VITALS
SYSTOLIC BLOOD PRESSURE: 116 MMHG | OXYGEN SATURATION: 96 % | TEMPERATURE: 98.4 F | DIASTOLIC BLOOD PRESSURE: 61 MMHG | HEART RATE: 60 BPM | RESPIRATION RATE: 18 BRPM

## 2024-10-05 VITALS
OXYGEN SATURATION: 95 % | TEMPERATURE: 98.2 F | DIASTOLIC BLOOD PRESSURE: 72 MMHG | RESPIRATION RATE: 22 BRPM | HEART RATE: 108 BPM | SYSTOLIC BLOOD PRESSURE: 166 MMHG

## 2024-10-05 DIAGNOSIS — R45.851 SUICIDAL IDEATION: ICD-10-CM

## 2024-10-05 PROBLEM — F15.10 METHAMPHETAMINE ABUSE (H): Status: ACTIVE | Noted: 2024-10-05

## 2024-10-05 PROBLEM — F10.10 ALCOHOL ABUSE: Status: ACTIVE | Noted: 2024-10-05

## 2024-10-05 PROBLEM — F60.2 ANTISOCIAL PERSONALITY DISORDER (H): Status: ACTIVE | Noted: 2024-10-05

## 2024-10-05 LAB — SARS-COV-2 RNA RESP QL NAA+PROBE: NEGATIVE

## 2024-10-05 PROCEDURE — 250N000013 HC RX MED GY IP 250 OP 250 PS 637: Performed by: STUDENT IN AN ORGANIZED HEALTH CARE EDUCATION/TRAINING PROGRAM

## 2024-10-05 PROCEDURE — 99285 EMERGENCY DEPT VISIT HI MDM: CPT

## 2024-10-05 PROCEDURE — 99283 EMERGENCY DEPT VISIT LOW MDM: CPT

## 2024-10-05 PROCEDURE — 87635 SARS-COV-2 COVID-19 AMP PRB: CPT | Performed by: EMERGENCY MEDICINE

## 2024-10-05 RX ORDER — IBUPROFEN 200 MG
200 TABLET ORAL EVERY 4 HOURS PRN
COMMUNITY
Start: 2024-06-08 | End: 2024-10-05

## 2024-10-05 RX ORDER — ACETAMINOPHEN 325 MG/1
650 TABLET ORAL ONCE
Status: COMPLETED | OUTPATIENT
Start: 2024-10-05 | End: 2024-10-05

## 2024-10-05 RX ORDER — CHLORHEXIDINE GLUCONATE ORAL RINSE 1.2 MG/ML
SOLUTION DENTAL
COMMUNITY
Start: 2024-06-08 | End: 2024-10-05

## 2024-10-05 RX ORDER — LAMOTRIGINE 150 MG/1
300 TABLET ORAL DAILY
Status: DISCONTINUED | OUTPATIENT
Start: 2024-10-05 | End: 2024-10-05 | Stop reason: HOSPADM

## 2024-10-05 RX ORDER — OLANZAPINE 5 MG/1
10 TABLET, ORALLY DISINTEGRATING ORAL 2 TIMES DAILY PRN
Status: DISCONTINUED | OUTPATIENT
Start: 2024-10-05 | End: 2024-10-06 | Stop reason: HOSPADM

## 2024-10-05 RX ORDER — ESCITALOPRAM OXALATE 10 MG/1
20 TABLET ORAL DAILY
Status: DISCONTINUED | OUTPATIENT
Start: 2024-10-05 | End: 2024-10-05 | Stop reason: HOSPADM

## 2024-10-05 RX ORDER — LAMOTRIGINE 150 MG/1
300 TABLET ORAL DAILY
Status: DISCONTINUED | OUTPATIENT
Start: 2024-10-06 | End: 2024-10-06 | Stop reason: HOSPADM

## 2024-10-05 RX ORDER — ESCITALOPRAM OXALATE 5 MG/1
20 TABLET ORAL DAILY
Status: DISCONTINUED | OUTPATIENT
Start: 2024-10-06 | End: 2024-10-06 | Stop reason: HOSPADM

## 2024-10-05 RX ORDER — LAMOTRIGINE 300 MG/1
300 TABLET, EXTENDED RELEASE ORAL DAILY
COMMUNITY
Start: 2024-09-23 | End: 2024-10-05

## 2024-10-05 RX ADMIN — LAMOTRIGINE 300 MG: 150 TABLET ORAL at 12:49

## 2024-10-05 RX ADMIN — ACETAMINOPHEN 650 MG: 325 TABLET, FILM COATED ORAL at 12:49

## 2024-10-05 RX ADMIN — ESCITALOPRAM OXALATE 20 MG: 10 TABLET ORAL at 12:49

## 2024-10-05 ASSESSMENT — ACTIVITIES OF DAILY LIVING (ADL)
ADLS_ACUITY_SCORE: 38

## 2024-10-05 ASSESSMENT — COLUMBIA-SUICIDE SEVERITY RATING SCALE - C-SSRS
5. HAVE YOU STARTED TO WORK OUT OR WORKED OUT THE DETAILS OF HOW TO KILL YOURSELF? DO YOU INTEND TO CARRY OUT THIS PLAN?: NO
1. IN THE PAST MONTH, HAVE YOU WISHED YOU WERE DEAD OR WISHED YOU COULD GO TO SLEEP AND NOT WAKE UP?: NO
5. HAVE YOU STARTED TO WORK OUT OR WORKED OUT THE DETAILS OF HOW TO KILL YOURSELF? DO YOU INTEND TO CARRY OUT THIS PLAN?: YES
6. HAVE YOU EVER DONE ANYTHING, STARTED TO DO ANYTHING, OR PREPARED TO DO ANYTHING TO END YOUR LIFE?: YES
6. HAVE YOU EVER DONE ANYTHING, STARTED TO DO ANYTHING, OR PREPARED TO DO ANYTHING TO END YOUR LIFE?: NO
2. HAVE YOU ACTUALLY HAD ANY THOUGHTS OF KILLING YOURSELF IN THE PAST MONTH?: YES
3. HAVE YOU BEEN THINKING ABOUT HOW YOU MIGHT KILL YOURSELF?: NO
4. HAVE YOU HAD THESE THOUGHTS AND HAD SOME INTENTION OF ACTING ON THEM?: NO
1. IN THE PAST MONTH, HAVE YOU WISHED YOU WERE DEAD OR WISHED YOU COULD GO TO SLEEP AND NOT WAKE UP?: YES
3. HAVE YOU BEEN THINKING ABOUT HOW YOU MIGHT KILL YOURSELF?: YES
2. HAVE YOU ACTUALLY HAD ANY THOUGHTS OF KILLING YOURSELF IN THE PAST MONTH?: YES
4. HAVE YOU HAD THESE THOUGHTS AND HAD SOME INTENTION OF ACTING ON THEM?: NO

## 2024-10-05 NOTE — TELEPHONE ENCOUNTER
[place]: [place]  Access Inpatient Bed Call Log 10/5/2024/2024 @7:06 am:     Intake has called facilities that have not updated the bed status within the last 12 hours.           [place]:                  [facility] is posting 0 beds.               [facility] is posting 0 beds. [phone];  10:54 AM @ Cap  [facility] is posting 0 beds. Negative covid required.  Per call at 7:11 am to Bradly.  [facility] is posting 0 beds. Neg covid. No high school/[facility]-psych. [phone]   [facility] is posting 0 beds. [phone]    [facility] is posting 0 beds. [phone]      [facility] is posting 7 beds. (Ages 18-35) Negative covid. [phone] Pt not age appropriate.   [facility] is posting 0 beds.   [facility] is posting 0 beds. [phone]  Per call at 7:11 am to Bradly, they are at cap at all [facility] locations.         [place]:    [facility] is posting 5 beds. LOW acuity ONLY. Mixed unit 12+. Negative covid- [phone];     [facility] has 2 beds posted. No aggression. Negative Covid. Low acuity  Per call at 7:11 am to Bradly, @ noé   [facility] ([place]) is posting 0 beds. Low acuity only. Neg covid.  [phone]     [facility] is posting 2 beds. Low acuity. No current aggression.  Per call at 7:11 am to Bradly, @ noé   [facility] is posting 0 beds. Negative covid. [phone], [phone].  Per call at 7:24 am  [facility] ([place]) is posting 0 beds available. Negative covid.  [phone].         [facility] is posting 1 bed. Low acuity. 72 HH hold preferred. Negative covid required. [phone];  per call at 7:28 am to Clara, they have possibly 1 bed avail.   [facility] ([place]) is posting 0 beds. Low acuity only. Neg covid.  [phone].   Per call at 7:30 am to Kathy, they are at cap.   [facility] [place] [facility]  Negative covid required.   Vol only, No  history of aggression, violence, or assault. No sexual offenders. No 72 HH holds. [phone]; Pt not appropriate d/t mayda for violent beh. Flag.   [facility] is posting 3 beds. Negative covid required.  (Must have the cognitive ability to do programming. No aggressive or violent behavior or recent HX in the last 2 yrs. MH must be primary.) Always low acuity. [phone] Pt not appropriate d/t mayda for violent beh. Flag  [facility] has 0 beds posted. Negative covid required.  Low acuity only. Violence and aggression capped.  [phone];     [facility] is posting 2 beds. Low acuity, Negative covid required. [phone].  Per call at 7:33 am to [facility], they are at cap.   [facility] is posting 0 beds Negative covid required.  [phone] Per Tiffany @12:26 PM, not taking any admissions today.   [facility] [place] is posting 6 beds. Negative covid. LOW acuity. (No lines, drains, or tubes, oxygen, CPAP, IV, etc.) Must Have a Ride Home. [phone]. Per call at 7:36 am to Janett, they have 6 beds avail.   [facility] is posting 2 beds. Negative covid. (No. lines, drains, or tubes, oxygen, CPAP, IV, etc.) [phone]  per call at 7:38 am to Cristofer, they have no high acuity beds avail and have 6 general unit beds avail.         Pt remains on the work list pending appropriate bed availability.          12:52 PM Per Jak @  [facility] can review for admission.   12:59 PM Clinical faxed to [facility] for review.   2:13 PM Canceled review at [facility] as pt has discharged from ED.

## 2024-10-05 NOTE — ED NOTES
"Patient on call light. Stating \"I want to leave\". Denying SI. MD made aware patient is wanting to leave. MD at bedside to speak with patient.   "

## 2024-10-05 NOTE — PHARMACY-ADMISSION MEDICATION HISTORY
Pharmacist Admission Medication History    Admission medication history is complete. The information provided in this note is only as accurate as the sources available at the time of the update.    Information Source(s): Patient / CareEverywhere and Surescripts fill history via in-person    Pertinent Information:   -- Appears patient got a prescription for XR lamictal on 9/23 from tele psych provider however per patient he is back on IR tablets and taking 2 tablets to make 300 mg dose.    Changes made to PTA medication list:  Added: None  Deleted: Ibuprofen, chlorhexidine  Changed: None    Allergies reviewed with patient and updates made in EHR: yes    Medication History Completed By: Michele Darden RPH 10/5/2024 11:42 AM    PTA Med List   Medication Sig Last Dose    escitalopram (LEXAPRO) 20 MG tablet Take 20 mg by mouth daily. Past Week at 10/3    lamoTRIgine (LAMICTAL) 150 MG tablet Take 300 mg by mouth daily. Past Week at 10/3

## 2024-10-05 NOTE — ED TRIAGE NOTES
Patient just discharged from ED and walked back to the triage desk telling staff that he feels very suicidal and needs to be seen right away.

## 2024-10-05 NOTE — PLAN OF CARE
[facility]  October 5, 2024  Plan of Care Hand-off Note     Patient Care Path:  IP MH    Plan for Care:   Pt returned to triage desk immediately following discharge from ED. This time, he reported he was suicidal with plan to hang himself. He yelled anf swore at MD. He was calm during DEC assessment, although minimally responsive. He was unable/unwilling to contract for safety with MD or LMHP. While it appears Pt may be requesting admission for secondary gain, his history of substance abuse and chronic SI put him at risk. Of note is that Pt is now reporting a plan and is unable to contract for safety. He has been added to the work list for IP admission. Pt has historically wanted to discharge prior to placement. If Pt does want to discharge, he will be reassessed by DEC    Identified Goals and Safety Issues: decrease SI, Pt indicated plan to hang self, unable/unwilling to contract for safety    Overview: Pt has been to multiple Eds over past several days. He declined recommended services (detox, SA referrals).          Legal Status: Legal Status at Admission: Voluntary/Patient has signed consent for treatment, Commitment         Updated  Md, RN regarding plan of care.           ANGELA Mckeon

## 2024-10-05 NOTE — CONSULTS
Diagnostic Evaluation Consultation  Crisis Assessment    Patient Name: Fran Lowery  Age:  43 year old  Legal Sex: male  Gender Identity: male  Pronouns:   Race: White  Ethnicity: Not  or   Language: English      Patient was assessed:     Crisis Assessment Start Date: 10/05/24  Crisis Assessment Start Time: 0525  Crisis Assessment Stop Time: 0535  Patient location: [facility] EMERGENCY DEPT                             ED22    Referral Data and Chief Complaint  Fran Lowery presents to the ED by  self (Pt discharged from ED and immediately approarched triage desk reporting that he was suicidal with plan to hang himself). Patient is presenting to the ED for the following concerns: Suicidal ideation, Substance use.   Factors that make the mental health crisis life threatening or complex are:  Pt presented to the ED on 9/4  for Substance use, Intoxication, Depression, Anxiety, Worsening psychosocial stress. He declined detox as was recommended. He denies SI at that time. Upon discharge from ED, Pt walked directly to triage desk and reported he was suicidal with plan of hangging himself..      Informed Consent and Assessment Methods  Explained the crisis assessment process, including applicable information disclosures and limits to confidentiality, assessed understanding of the process, and obtained consent to proceed with the assessment.  Assessment methods included conducting a formal interview with patient, review of medical records, collaboration with medical staff, and obtaining relevant collateral information from family and community providers when available.  : done     Patient response to interventions: acceptance expressed  Coping skills were attempted to reduce the crisis:        History of the Crisis   Per DEC consult 9/4/2025, Pt has historical diagnosis of Polysubstance Use Disorder, Bipolar 2 Disorder, Mood Disorder, Antisocial Personality Disorder, MDD, and  PTSD. Patient reports that today he was drinking prior to arrival. He reports that he last used methamphetamine yesterday morning. He has not taken his prescribed medications for two days because of his substance use.     Per chart review, patient is homeless. He reports that he is under commitment for CDMI. He has been working with his  to refer him to a substance use disorder treatment program. Pt declined detox.    Brief Psychosocial History  Family:  Single, Children no  Support System:  Other (specify) ( [employer])  Employment Status:  unemployed  Source of Income:     Financial Environmental Concerns:  unemployed, other (see comments) (homeless)  Current Hobbies:     Barriers in Personal Life:       Significant Clinical History  Current Anxiety Symptoms:   (none reported)  Current Depression/Trauma:  thoughts of death/suicide  Current Somatic Symptoms:   (none reoported)  Current Psychosis/Thought Disturbance:  hostile/aggressive (Pt was calm and cooperative with LMHP, but was verbally aggressive (yelling and swearing) towards other ED personnel inclluding Cristina KIRKPATRICK)  Current Eating Symptoms:   (none reported)  Chemical Use History:  Alcohol: Binge  Last Use:: 10/04/24  Benzodiazepines: None  Opiates: None  Cocaine: None  Marijuana: Daily  Last Use:: 10/04/24  Other Use: Methamphetamines  Last Use:: 10/03/24   Past diagnosis:  Anxiety Disorder, Depression, Personality Disorder, Substance Use Disorder, PTSD  Family history:  No known history of mental health or chemical health concerns  Past treatment:  Case management, Civil Commitment, Probation/Court ordered treatment, Inpatient Hospitalization, Supportive Living Environment (group home, retirement house, etc)  Details of most recent treatment:  Currently on MICD commitment through [facility],  Other relevant history:  Pt is not engaged in any recent treatment. He reports CM is working with him to get SA treatment. Pt declined detox  and SA referrals in ED       Collateral Information  Is there collateral information: No (messages left for mother and cm)     Collateral information name, relationship, phone number:       What happened today:       What is different about patient's functioning:       Concern about alcohol/drug use:      What do you think the patient needs:      Has patient made comments about wanting to kill themselves/others:      If d/c is recommended, can they take part in safety/aftercare planning:       Additional collateral information:        Risk Assessment  [place] Suicide Severity Rating Scale Full Clinical Version:  Suicidal Ideation  Q6 Suicide Behavior (Lifetime): no          [place] Suicide Severity Rating Scale Recent:   Suicidal Ideation (Recent)  Q1 Wished to be Dead (Past Month): yes  Q2 Suicidal Thoughts (Past Month): yes  Q3 Suicidal Thought Method: yes  Q4 Suicidal Intent without Specific Plan: no  Q5 Suicide Intent with Specific Plan: yes  If yes to Q6, within past 3 months?: yes  Level of Risk per Screen: high risk     Suicidal Behavior (Recent)  Actual Attempt (Past 3 Months): No  Preparatory Acts or Behavior (Past 3 Months): No    Environmental or Psychosocial Events: unstable housing, homelessness, ongoing abuse of substances, neither working nor attending school  Protective Factors: Protective Factors: other (see comment) (knowledgeable of community resources to get needs met)    Does the patient have thoughts of harming others? Feels Like Hurting Others: no  Previous Attempt to Hurt Others: no  Is the patient engaging in sexually inappropriate behavior?: no    Is the patient engaging in sexually inappropriate behavior?  no        Mental Status Exam   Affect: Flat  Appearance: Disheveled  Attention Span/Concentration: Attentive  Eye Contact: Variable    Fund of Knowledge: Appropriate   Language /Speech Content: Fluent  Language /Speech Volume: Normal  Language /Speech Rate/Productions: Minimally  Responsive  Recent Memory: Intact  Remote Memory: Intact  Mood: Normal  Orientation to Person: Yes   Orientation to Place: Yes  Orientation to Time of Day: No  Orientation to Date: No     Situation (Do they understand why they are here?): Yes  Psychomotor Behavior: Normal  Thought Content: Suicidal  Thought Form: Goal Directed     Mini-Cog Assessment  Number of Words Recalled:    Clock-Drawing Test:     Three Item Recall:    Mini-Cog Total Score:       Medication  Psychotropic medications:   Medication Orders - Psychiatric (From admission, onward)      None             Current Care Team  Patient Care Team:  No Ref-Primary, Physician as PCP - General    Diagnosis  Patient Active Problem List   Diagnosis Code    CARDIOVASCULAR SCREENING; LDL GOAL LESS THAN 160 Z13.6    Suicidal ideation R45.851    Bipolar 2 disorder (H) F31.81    Alcohol dependence in remission (H) F10.21    History of hip fracture Z87.81    Marijuana use F12.90    Substance induced mood disorder (H) F19.94    Bipolar affective disorder, current episode manic, current episode severity unspecified (H) F31.10    Mood disorder (H) F39    Stimulant use disorder F15.90    Alcohol use disorder, moderate, dependence (H) F10.20    Anxiety F41.9    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 This time, he reported he was suicidal with plan to hang himself. He yelled anf swore at MD. He was calm during DEC assessment, although minimally responsive. He was unable/unwilling to contract for safety with MD or [facility]. While it appears Pt may be  requesting admission for secondary gain, his history of substance abuse and chronic SI put him at risk. Of note is that Pt is now reporting a plan and is unable to contract for safety. He has been added to the work list for IP admission. Pt has historically wanted to discharge prior to placement. If Pt does want to discharge, he will be reassessed by DEC       Imminent risk of harm: Suicidal Behavior  Severe psychiatric, behavioral or other comorbid conditions are appropriate for management at inpatient mental health as indicated by at least one of the following: Psychiatric Symptoms, Comorbid substance use disorder, Symptoms of impact to function, Impaired impulse control, judgement, or insight  Severe dysfunction in daily living is present as indicated by at least one of the following: Complete inability to maintain any appropriate aspect of personal responsibility in any adult roles, Other evidence of severe dysfunction  Situation and expectations are appropriate for inpatient care: Voluntary treatment at lower level of care is not feasible, Patient management/treatment at lower level of care is not feasible or is inappropriate, Biopsychosocial stresses potentially contributing to clinical presentation (co morbidities) have been assessed and are absent or manageable at proposed level of care  Inpatient mental health services are necessary to meet patient needs and at least one of the following: Specific condition related to admission diagnosis is present and judged likely to further improve at proposed level of care, Specific condition related to admission diagnosis is present and judged likely to deteriorate in absence of treatment at proposed level of care      Patient coping skills attempted to reduce the crisis:       Disposition  Recommended disposition: Inpatient Mental Health        Reviewed case and recommendations with attending provider. Attending Name: Dr Ludmila Moss       Attending concurs with  disposition: yes       Patient and/or validated legal guardian concurs with disposition:   yes       Final disposition:       Legal status on admission: Voluntary/Patient has signed consent for treatment, Commitment    Assessment Details   Total duration spent with the patient: 10 min     CPT code(s) utilized: Non-Billable    ANGELA Mckeon, Psychotherapist  DEC - Triage & Transition Services  Callback: [phone]

## 2024-10-05 NOTE — ED NOTES
[facility] contacted and states they do have a bed available and to call back when urine results are in.

## 2024-10-05 NOTE — TELEPHONE ENCOUNTER
S: [facility] ED , DEC  Mitali  calling at 5:46 AM about a 43 year old/Male presenting with intoxication and mh concerns when first arrived to ED. Pt left ED and walked back in to be seen for SI. Pt would not contract safety.    B: Pt arrived via Self . Presenting problem, stressors: Pt has chronic stressors of being un housed, meth and alcohol use. Pt is under commitment with [facility]. Per Pt after chronic estrada he has lots of issues but would not elaborate. Pt was yelling and agitated at other staff.    Pt affect in ED: Calm  Pt Dx: Generalized Anxiety Disorder, Bipolar Disorder, PTSD, Borderline Personality Disorder, ADHD, and Substance Use Disorder: cocaine, alcohol and meth  Previous IPMH hx? Yes: January 20224, Aug, Nov, and Dec 2023.  Pt endorses SI with a plan to hang himself    Hx of suicide attempt? No  Pt denies SIB  Pt denies HI   Pt denies hallucinations .   Pt RARS Score: 4    Hx of aggression/violence, sexual offenses, legal concerns, Epic care plan? describe: No  Current concerns for aggression this visit? Yes: Chart note risk for violent behavior. Pt has been yelling and agitated.  Does pt have a history of Civil Commitment? Yes, most recent commitment 2024  Is Pt their own guardian? Yes    Pt is not prescribed medication. Is patient medication compliant? N/A  Pt denies OP services   CD concerns: Actively using/consuming alcohol and meth  Acute or chronic medical concerns: No  Does Pt present with specific needs, assistive devices, or exclusionary criteria? None      Pt is ambulatory  Pt is able to perform ADLs independently      A: Pt to be reviewed for [facility] admission. Pt is Voluntary  Preferred placement: Statewide +[facility]    COVID Symptoms: No  If yes, COVID test required   Utox: Ordered, not yet collected   CMP: Abnormalities: Anion Gap 17,Glucose 106,  CBC: WNL  HCG: N/A    R: Patient cleared and ready for behavioral bed placement: Yes  Pt placed on IP worklist? Yes    Does  Patient need a Transfer Center request created? Yes, writer completed Transfer Center request at:  6:04 AM     R: [place]  Access Inpatient Bed Call Log 10/4/24 at 11:46 PM     Intake has called facilities that have not updated the bed status within the last 12 hours.   -ANYWHERE                            [facility] is at capacity.            [facility] is posting 0 beds. [phone]    [facility] is posting 0 beds. Negative covid required.   [facility] is posting 0 beds. Neg covid. No high school/[facility]-psych.    [facility] is posting 0 beds. [phone]   [facility] is posting 0 beds. [phone]    [facility] is posting7 beds. Negative covid. [phone]  Per call at 3:11 am to Yohannes marino to take reviews until 7:30-8 am.  [facility] is posting 0 beds [phone].       [facility] is posting 5 beds. LOW acuity ONLY. Mixed unit 12+. Negative covid- [phone]   [facility] has 2 beds posted. No aggression. Negative Covid. Low acuity.   [facility] ([place] is posting 0 beds. Low acuity only. Neg covid.  [phone]    [facility] is posting 2 beds. Low acuity. No current aggression.     [facility] is posting 0 beds. Negative covid. [phone]    [facility] ([place] is posting 2 beds available. Negative covid.  [phone].       [facility] is posting 1 beds. Low acuity. 72 HH hold preferred. Negative covid required. [phone]    [facility] ([place] is posting 0 bed. Low acuity only. Neg covid.  [phone]    [facility] in [place] is posting 4 beds.  Negative covid required.   Vol only, No history of aggression, violence, or assault. No sexual offenders. No 72 HH holds. [phone]        [facility] is posting 3 beds. Negative covid required.  (Must have the cognitive ability to do programming. No aggressive or violent behavior or  recent HX in the last 2 yrs. MH must be primary.) Always low acuity. [phone]    [facility] has 3 beds posted. Negative covid required.  Low acuity only. Violence and aggression capped.  [phone]    [facility] is posting 2 beds. Low acuity, Negative covid required. [phone]    [facility] posting 0 beds. Negative covid required.  [phone]    [facility] [place] is posting 7 beds. Negative covid. LOW acuity. (No lines, drains, or tubes, oxygen, CPAP, IV, etc.) Must Have a Ride Home. [phone]    [facility] is posting 2 beds. Negative covid. (No. lines, drains, or tubes, oxygen, CPAP, IV, etc.) [phone]    [facility] is posting 28 beds. No covid test required. [phone] Per call @8:11am       Pt remains on the work list pending appropriate bed availability.

## 2024-10-05 NOTE — DISCHARGE INSTRUCTIONS
Substance Use Disorder Direct Access Resources    It is recommended that you abstain from all mood altering chemicals. Please contact the sober support hotline (594-264-9946) as needed; phones are answered 24 hours a day, 7 days a week.    To access substance use treatment you must have a comprehensive assessment completed to begin any treatment program.     If uninsured, please contact your county of residence for eligibility screen to substance use disorder evaluation and treatment:    [place] - [phone]   [place] - [phone]   [facility] [phone]   [place] - [phone]   [place]  [phone]   [place] - [phone]   [facility] [phone]   [place] - [phone]     If you have private insurance, call the customer service number on the back of your insurance card to find an in-network substance abuse use disorder assessment. The ideal provider will be a treatment facility, licensed in the [facility].     Community CHANDLER Evaluations: Clients may call their [facility] for a full list of providers - Availability and services listed belo are subject to change, please call the provider to confirm    [facility]  [phone]  [facility] [place], [place], [address]  *Please call the above number to schedule a comprehensive assessment for determination of level of care needs. In person and virtual appointments available Mon-Fri.    [facility] [address] Floor, [address], [place] [address] (next to the outpatient lab)    Phone: [phone]   Provides bridging services to people with Opiate Use Disorders (OUD) seeking care. This is a front door to Medication Assisted Treatments (MAT), ages 16+  Walk In hours: Monday-Friday 9:00am-3:00pm    [facility]  [phone]  Walk in Assessments: Mon-Friday 7a-1:45p  [address], [place], [address]    [facility] - [facility]  Central Access [phone]  [address] [place] [address]  [place], [place], [address]  *by appointment only    [facility]  [phone] (phone consultation available )  Locations in: [place], [place], [facility], and [place], [place]  [place] virtual IOP [phone] or visit [email or web address]   Also offers LGBTQ programming     [facility]  [phone]  [address]  [place], [place], [address]  *Currently only offered via telehealth - call to set up an appointment    [facility]  [address] [place], [place], [address]  [facility]  For more information to to make a referral call:  [phone]  Walk-in on   9-11 a.m.    [facility]  [phone]  [address] [place], [place], [address]  *available by appointments only    [facility]  [phone]  [address] [place], [place], [address]  *available by appointment only    [facility]  [phone]  [address], [place], [address]  *walk in assessments available M-F starting at 7 am.    [facility]  [phone]  Locations: [facility], [facility], and [place]  *Walk in assessments availble M-F starting at 8 am -virtual only    [facility]  [phone]  [address] [place] [address]    [facility]  Virtual + Locations: [place], [place], [place], [place], [facility], [place], [place], [place]   [phone]  *available by appointment only    [facility]  [phone]  [address]  [place], [place], [address]    [facility]  [phone]  [address] [place], [address]  *available by appointment    [facility]  [phone]  [address] [place], [place], [address]  *walk ins available M-TH from 9-3    [facility] program: [phone]  [address]  [place], [place], [address]    [facility]  [phone]  Same day substance use disorder assessments are available Monday - Friday, via walk-in or by appointment at the [place] location.  [address], [address], [place] [address]     [facility] - adolescent and adult SUDs services  [phone]  Offer services Monday through Friday, as well as evening hours Monday through Thursday. Normally, a first appointment will be scheduled within one week  [email or web address]  Locations all over [place]    If you are intoxicated, you may be required to detox at a detox facility before starting treatment. The following are detox facilities that you can self present to. All detox facilities are able to help you complete an assessment prior to discharge if you choose:    [facility]: Arrive at a [facility] for immediate medical evaluation    [facility]: [address] [place], [place], [address].         [phone]    [facility]: [address] [place], [place], [address]  [phone]     [facility]): [address] [place], [place], [address]  [phone]     [facility]: [address] [place], [place], [address], [phone]         Ways to help cope with sobriety:    -- Take prescribed medicines as scheduled  -- Keep follow-up appointments  -- Talk to others about your concerns  -- Get regular exercise  -- Practice deep breathing skills  -- Eat a healthy diet  -- Use community resources, including hotline numbers, [facility] crisis and support meetings  -- Stay sober and avoid places/people/things associated with substance use  --Maintain a daily schedule/routine  --Get at least 7-8 hours of sleep per night  --Create a list 10--20 healthy activities that you can do that are enjoyable and do not involve substance use  --Create daily goals (approx. 1-4 goals) per day and work to achieve them throughout the day.       Free  Resources:    [facility] ([facility])  [facility] connects people seeking recovery to resources that help foster and sustain long-term recovery. Whether you are seeking resources for treatment, transportation, housing, job training, education, health care or other pathways to recovery, [facility] is a great place to start.  Phone: [phone]. www.[place].Kopi (Great listing of all types of recovery and non-recovery related resources)    Alcoholics Anonymous  Phone: -371-ALCOHOL  Website: HTTP://WWW.AA.ORG/  [facility] [place] (901-188-2752 or http://aaLombardi Residential.org)  [facility] [place] (989-952-6670 or www.aastpaul.org)     Narcotics Anonymous  Phone: 681.405.9367  Website: www.TeamRock.Socset..    [facility] [address], [place], [place],  Phone: [phone]  Drop-in Hours: Monday-Friday 9-11:30 am. By appointment at other times.  Provides: [facility] is a drop-in center on the east side [facility] that provides a safe space for individuals who are homeless and have a history of chemical use. Sobriety is not a requirement but drugs and alcohol are not allowed on the property.  Services: Non-clients can access drop-in services such as Recovery and Harm Reduction Groups, referrals to case management, community activities, shower facilities, and a pool table. Individuals who are homeless and have chemical health needs may be eligible for enrollment into [facility]'s case management program. Clients and  work together to access benefits, treatment, health care, shelter, and external housing resources.

## 2024-10-05 NOTE — ED NOTES
Pt related to writer that he is requesting [facility].  Provider notified and requested writer call [facility].  [facility] contacted and states they will follow DEC 's recommendation for discharge.  Provider notified.

## 2024-10-05 NOTE — ED PROVIDER NOTES
Emergency Department Note      History of Present Illness     Chief Complaint   Suicidal      HPI   Fran Lowery is a 43 year old male with history of antisocial personality disorder, bipolar 2 disorder, anxiety, depression, and alcohol use disorder who presents with suicidal thoughts. The patient was just discharged from the ED and walked back to the triage desk telling staff that he is now suicidal and needs to be psychiatrically evaluated. He states that his mental health has been poor recently and he has a plan to hang himself. The patient notes that he has been to [facility] before with previous attempts on his life. He states that he is on a commitment but does not say where. The patient is not interested in detox.    Labs from the visit earlier in the day:    COMPREHENSIVE METABOLIC PANEL - Abnormal       Result Value      Sodium 140        Potassium 3.8        Carbon Dioxide (CO2) 22        Anion Gap 17 (*)       Urea Nitrogen 17.6        Creatinine 1.10        GFR Estimate 85        Calcium 9.4        Chloride 101        Glucose 106 (*)       Alkaline Phosphatase 51         (*)       ALT 57        Protein Total 7.1        Albumin 4.7        Bilirubin Total 1.0      ETHYL ALCOHOL LEVEL - Abnormal     Alcohol ethyl 0.07 (*)     MAGNESIUM - Normal     Magnesium 2.1      PHOSPHORUS - Normal     Phosphorus 2.5      CBC WITH PLATELETS AND DIFFERENTIAL     WBC Count 8.0        RBC Count 4.78        Hemoglobin 15.2        Hematocrit 43.1        MCV 90        MCH 31.8        MCHC 35.3        RDW 12.9        Platelet Count 189        % Neutrophils 60        % Lymphocytes 28        % Monocytes 9        % Eosinophils 2        % Basophils 1        % Immature Granulocytes 0        NRBCs per 100 WBC 0        Absolute Neutrophils 4.8        Absolute Lymphocytes 2.3        Absolute Monocytes 0.7        Absolute Eosinophils 0.2        Absolute Basophils 0.1        Absolute Immature Granulocytes 0.0         Absolute NRBCs 0.0      LAMOTRIGINE LEVEL       Independent Historian   None    Review of External Notes   I reviewed the emergency department visit note from earlier in the day.  Patient reports that he was spiraling.  He denied active suicidal ideation.  He was discharged after DEC assessment.    I reviewed the DEC assessment note on the previous day.  Patient complaint did with concerns of substance use, intoxication, depression, anxiety, worsening psychosocial stress.  Did not meet criteria for inpatient admission.  Recommended discharge.    I reviewed the emergency department visit note on 10/3/2024 to [facility] [place].  Patient was evaluated and ultimately discharged.  He had to be escorted out of the hospital by security.  He reported at that time that he was going to hang himself.    Past Medical History     Medical History and Problem List   Anxiety   ADHD  Astigmatism   Acetabular   Antisocial personality disorder   Alcohol use disorder   Bipolar disorder 2   Cannabis abuse disorder   Cocaine abuse   Caries   SI  Stimulant use disorder   Methamphetamine use disorder  Depression  Male infertility   Presbyopia  PTSD       Medications   Lexapro   Lamictal      Surgical History   Right tendon repair   [place] teeth extraction   Musculoskeletal procedure   Knee arthroscopy   Shoulder arthroscopy     Physical Exam     Patient Vitals for the past 24 hrs:   SpO2   10/05/24 0421 97 %     Physical Exam  General: Well-nourished, no acute distress  Eyes: PERRL, conjunctivae pink no scleral icterus or conjunctival injection  ENT:  Moist mucus membranes  Respiratory:  No respiratory distress  CV: Normal rate   Skin: Warm, dry.  No rashes or petechiae  Musculoskeletal: No peripheral edema or calf tenderness  Neuro: Alert and oriented to person/place/time  Psychiatric: Anxious affect, pressured speech.  Hyperverbal.  States he is going to go hang himself repeatedly.  No apparent hallucinations.  Unable to  contract for safety.    Diagnostics     Lab Results   Labs Ordered and Resulted from Time of ED Arrival to Time of ED Departure - No data to display    Imaging   No orders to display     Independent Interpretation   None    ED Course      Medications Administered   Medications - No data to display      Discussion of Management   None    ED Course   ED Course as of 10/05/24 0540   Sat Oct 05, 2024   0229 I obtained history and examined the patient as noted above   0235   I called and requested a return call from the DEC .  They requested that I place a DEC assessment order.    Additional Documentation  None    Medical Decision Making / Diagnosis     CMS Diagnoses: None    MIPS       None    MDM   Fran Lowery is a 43 year old male who comes for concern of suicidal ideation with a plan to hang himself.  A DEC assessment have been done on the previous day but at that time the patient was not actively suicidal.  A repeat DEC assessment will be done as the patient reports a change in his mental health status.  He is adamant that he will hang himself if he is not admitted.  He is unable to contract for safety.  The DEC  evaluated the patient and felt that at this time he would benefit from inpatient admission.  The patient is voluntary and requested inpatient admission.  Details of his commitment are not clear.  An inpatient bed request was made.  Laboratory studies on the previous day were all reassuring and he is medically cleared.  He awaits transfer when a mental health bed becomes available.    Disposition   Care of the patient was transferred to my colleague Dr. Almaraz pending mental health admission    Diagnosis     ICD-10-CM    1. Suicidal ideation  R45.851            Discharge Medications   New Prescriptions    No medications on file         Scribe Disclosure:  I, Harsha Henao, am serving as a scribe at 2:03 AM on 10/5/2024 to document services personally performed by Ludmila Moss MD  based on my observations and the provider's statements to me.        Ludmila Moss MD  10/05/24 0707

## 2024-10-05 NOTE — CONSULTS
Diagnostic Evaluation Consultation  Crisis Assessment    Patient Name: Fran Lowery  Age:  43 year old  Legal Sex: male  Gender Identity: male  Pronouns:   Race: White  Ethnicity: Not  or   Language: English      Patient was assessed: In person   Crisis Assessment Start Date: 10/04/24  Crisis Assessment Start Time: 1850  Crisis Assessment Stop Time: 1910  Patient location: [facility] EMERGENCY DEPT                             [facility]    Referral Data and Chief Complaint  Fran Lowery presents to the ED by  self. Patient is presenting to the ED for the following concerns: Substance use, Intoxication, Depression, Anxiety, Worsening psychosocial stress.       Factors that make the mental health crisis life threatening or complex are:  Fran Lowery is a 43 year old male who presents to the Emergency Department due to mary, alcohol intoxication and seeking mental health medications. Per chart review and patient reports, they have a historical diagnosis of Polysubstance Use Disorder, Bipolar 2 Disorder, Mood Disorder, Antisocial Personality Disorder, MDD, and PTSD. This is the patient's fourth Emergency Department visit this week for similar presentation. On each episode, patient has been assessed and discharged with the recommendation for substance use disorder treatment and or detox.      Patient presents with as hyper verbal and hyper active. Patient speaks with pressured loud speech. He displays tangential thoughts and loose associations. Patient  reports that he wants to take care of his mental health.     At the time of this assessment the patient denies suicidal and homicidal ideation. He reports that when he uses alcohol and methamphetamine, his mental health declines.   Patient reports feeling somewhat suicidal his whole life, but he has lost too many friends to suicide and just couldn't do that. Patient reports no history of suicide attempts..      Informed  Consent and Assessment Methods  Explained the crisis assessment process, including applicable information disclosures and limits to confidentiality, assessed understanding of the process, and obtained consent to proceed with the assessment.  Assessment methods included conducting a formal interview with patient, review of medical records, collaboration with medical staff, and obtaining relevant collateral information from family and community providers when available.  : done     Patient response to interventions: acceptance expressed, needs reinforcement  Coping skills were attempted to reduce the crisis:  Prior to arrival, patient attempted to utilize seeking Emergency Department support and using methamphetamine and alcohol.  Once in the Emergency Department, this writer attempted to engage with Fran by offering therapeutic listening and exploration into the presenting and precipitating events. This writer attempted to build rapport in a non confrontational manner, providing the patient to express feelings and thoughts as fully as possible. Patient was provided with a quiet area and time for them to decompress and calm their emotional expression.   Fran was not receptive to these interventions.     History of the Crisis   Patient is a white 43 year old male with a historical diagnosis of Polysubstance Use Disorder, Bipolar 2 Disorder, Mood Disorder, Antisocial Personality Disorder, MDD, and PTSD. Patient reports that today he was drinking prior to arrival. He reports that he last used methamphetamine yesterday morning. He has not taken his prescribed medications for two days because of his substance use.     Per chart review, patient is homeless. He reports that he is under commitment for CDMI. He has been working with his  to refer him to a substance use disorder treatment program.     Patient is future oriented and wants to get sober and be medication complaint. The patient has declined  detox at a different hospital yesterday. He stated that detox facilities can make his depression worse.    Brief Psychosocial History  Family:  Single, Children no  Support System:  Other (specify), Parent(s) ()  Employment Status:  unemployed  Source of Income:  none  Financial Environmental Concerns:  unable to afford rent/mortgage, unable to afford medication(s), unable to afford food  Current Hobbies:  music, outdoor activities  Barriers in Personal Life:  mental health concerns, financial concerns    Significant Clinical History  Current Anxiety Symptoms:  racing thoughts  Current Depression/Trauma:  negativistic  Current Somatic Symptoms:  racing thoughts  Current Psychosis/Thought Disturbance:  hyperverbal, grandiosity, elated mood, hyperactive, flight of ideas  Current Eating Symptoms:     Chemical Use History:  Alcohol: Binge (LAst use was today prior to arrival)  Last Use:: 10/04/24  Benzodiazepines: None  Opiates: None  Cocaine: None  Marijuana: Daily  Last Use:: 10/04/24  Other Use: Methamphetamines  Last Use:: 10/03/24   Past diagnosis:  Depression, Personality Disorder, PTSD, Substance Use Disorder, Anxiety Disorder  Family history:  No known history of mental health or chemical health concerns  Past treatment:  Case management, Probation/Court ordered treatment, Civil Commitment, Psychiatric Medication Management, Supportive Living Environment (group home, detention house, etc)  Details of most recent treatment:  Pt has not been actively engaged in any MH or [facility] tx.  Other relevant history:          Collateral Information  Is there collateral information: No (Calls made to Shahrzad Voss, , at [phone]; and  EMIR RAMIREZ (Mother) [phone])        Risk Assessment  [place] Suicide Severity Rating Scale Full Clinical Version:  Suicidal Ideation  Q1 Wish to be Dead (Lifetime): Yes  Q2 Non-Specific Active Suicidal Thoughts (Lifetime): Yes  3. Active Suicidal Ideation with any  Methods (Not Plan) Without Intent to Act (Lifetime): Yes  Q4 Active Suicidal Ideation with Some Intent to Act, Without Specific Plan (Lifetime): Yes  Q5 Active Suicidal Ideation with Specific Plan and Intent (Lifetime): No  Q6 Suicide Behavior (Lifetime): no     Suicidal Behavior (Lifetime)  Actual Attempt (Lifetime): No  Has subject engaged in non-suicidal self-injurious behavior? (Lifetime): No  Interrupted Attempts (Lifetime): No  Aborted or Self-Interrupted Attempt (Lifetime): No  Preparatory Acts or Behavior (Lifetime): No    [place] Suicide Severity Rating Scale Recent:   Suicidal Ideation (Recent)  Q1 Wished to be Dead (Past Month): yes  Q2 Suicidal Thoughts (Past Month): yes  Q3 Suicidal Thought Method: no  Q4 Suicidal Intent without Specific Plan: no  Q5 Suicide Intent with Specific Plan: no  If yes to Q6, within past 3 months?: no  Level of Risk per Screen: moderate risk  Intensity of Ideation (Recent)  Most Severe Ideation Rating (Past 1 Month): 2  Description of Most Severe Ideation (Past 1 Month): Thought of just not being here  Frequency (Past 1 Month): 2-5 times in week  Duration (Past 1 Month): Fleeting, few seconds or minutes  Controllability (Past 1 Month): Can control thoughts with little difficulty  Deterrents (Past 1 Month): Deterrents probably stopped you  Reasons for Ideation (Past 1 Month): Equally to get attention, revenge, or a reaction from others and to end/stop the pain  Suicidal Behavior (Recent)  Actual Attempt (Past 3 Months): No  Has subject engaged in non-suicidal self-injurious behavior? (Past 3 Months): No  Interrupted Attempts (Past 3 Months): No  Aborted or Self-Interrupted Attempt (Past 3 Months): No  Preparatory Acts or Behavior (Past 3 Months): No      Environmental or Psychosocial Events: challenging interpersonal relationships, impulsivity/recklessness, ongoing abuse of substances  Protective Factors: Protective Factors: help seeking, optimistic outlook - identification of  future goals    Does the patient have thoughts of harming others? Feels Like Hurting Others: no  Previous Attempt to Hurt Others: no  Is the patient engaging in sexually inappropriate behavior?: no    Is the patient engaging in sexually inappropriate behavior?  no        Mental Status Exam   Affect: Dramatic, Labile  Appearance:    Attention Span/Concentration: Inattentive  Eye Contact: Intense, Variable    Fund of Knowledge: Appropriate   Language /Speech Content: Expressive Speech  Language /Speech Volume: Loud  Language /Speech Rate/Productions: Pressured, Hyperverbal  Recent Memory: Poor  Remote Memory: Intact  Mood: Euphoric, Irritable  Orientation to Person: Yes   Orientation to Place: Yes  Orientation to Time of Day: Yes  Orientation to Date: Yes     Situation (Do they understand why they are here?): Yes  Psychomotor Behavior: Hyperactive  Thought Content: Delusions  Thought Form: Flight of Ideas, Loose Associations, Tangential       Medication  Psychotropic medications:   Medication Orders - Psychiatric (From admission, onward)      None             Current Care Team  Patient Care Team:  No Ref-Primary, Physician as PCP - General    Diagnosis  Patient Active Problem List   Diagnosis Code    CARDIOVASCULAR SCREENING; LDL GOAL LESS THAN 160 Z13.6    Suicidal ideation R45.851    Bipolar 2 disorder (H) F31.81    Alcohol dependence in remission (H) F10.21    History of hip fracture Z87.81    Marijuana use F12.90    Substance induced mood disorder (H) F19.94    Bipolar affective disorder, current episode manic, current episode severity unspecified (H) F31.10    Mood disorder (H) F39    Stimulant use disorder F15.90    Alcohol use disorder, moderate, dependence (H) F10.20    Anxiety F41.9    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   Patient reports wanting to take care of his mental health when he gets sober. He has struggled to maintain ongoing sobriety from methamphetamine and alcohol. Patient acknowledges that alcohol and methamphetamine exacerbate his preexisting mental health conditions.     Patient has a supportive relationship with his , Amber. She is aware that patient has been seeking detox and support for substance use disorder. Patient is seeking inpatient psychiatric hospitalization, however he does not meet criteria for that level of care. Patient's presenting symptoms of mary, disorganized thoughts, and pressured speech appear to be in alignment with the symptoms of alcohol and methamphetamine intoxication.     Patient denies suicidal or homicidal ideation, they deny auditory visual hallucinations. The patient does not present with any imminent risk factors that would indicate that a hold should be placed or that patient is in need of a higher level of care.     At time of assessment, although intoxicated, patient appears to be able to care for himself and engage in safety planning. For continuity of care, it is recommended that patient engage in long term substance use disorder and mental health treatment. Abstinence from all illicit substances and medication compliance will be key for patient's ongoing stabilization.      Patient coping skills attempted to reduce the crisis:  Prior to arrival, patient attempted to utilize seeking Emergency Department support and using methamphetamine and alcohol.    Once in the Emergency Department, this writer  attempted to engage with Fran by offering therapeutic listening and exploration into the presenting and precipitating events. This writer attempted to build rapport in a non confrontational manner, providing the patient to express feelings and thoughts as fully as possible.   Patient was provided with a quiet area and time for them to decompress and calm their emotional expression.   Fran was not receptive to these interventions.    Disposition  Recommended disposition:          Reviewed case and recommendations with attending provider. Attending Name: Emergency Department provider, DO Cinthya Rivera, was informed about the recommended disposition of discharge. They are in support of the disposition.       Attending concurs with disposition: yes       Patient and/or validated legal guardian concurs with disposition:   yes       Final disposition:  discharge    Legal status on admission: Voluntary/Patient has signed consent for treatment    Assessment Details   Total duration spent with the patient: 20 min     CPT code(s) utilized: Non-Billable    SHERINE Zamora, Psychotherapist  [facility] - Triage & Transition Services  Callback: [phone]

## 2024-10-05 NOTE — ED NOTES
Pt given all belongings and discharge paperwork.  Pt agitated due to not wanting to leave.  Provider notified writer prior to discharge that pt declined detox at this time.  Pt escorted to lobby and given two bus tokens and advised he may stay in lobby until buses run.

## 2024-10-05 NOTE — PROGRESS NOTES
IP MH Referral Acuity Rating Score (RARS)    LMHP complete at referral to IP MH, with [facility]; and, daily while awaiting IP MH placement. Call score to PPS.  CRITERIA SCORING   New 72 HH and Involuntary for IP MH (not adolescent) 0/1   Boarding over 24 hours 0/1   Vulnerable adult at least 55+ with multiple co morbidities; or, Patient age 11 or under 0/1   Suicide ideation without relief of precipitating factors 1/1   Current plan for suicide 1/1   Current plan for homicide 0/1   Imminent risk or actual attempt to seriously harm another without relief of factors precipitating the attempt 0/1   Severe dysfunction in daily living (ex: complete neglect for self care, extreme disruption in vegetative function, extreme deterioration in social interactions) 1/1   Recent (last 2 weeks) or current physical aggression in the ED 0/1   Restraints or seclusion episode in ED 0/1   Verbal aggression, agitation, yelling, etc., while in the ED 1/1   Active psychosis with psychomotor agitation or catatonia 0/1   Need for constant or near constant redirection (from leaving, from others, etc).  0/1   Intrusive or disruptive behaviors 0/1   TOTAL Acuity Total Score: 4

## 2024-10-05 NOTE — ED NOTES
Writer attempted to get urine sample.  Pt attempted to use urinal and was unsuccessful.  Pt given water and advised to attempt to use urinal again when able to provide sample.  Pt verbalized understanding.  Pt calm and cooperative at this time.

## 2024-10-05 NOTE — ED PROVIDER NOTES
I assumed care of the patient from Dr. Moss.  Plan was for voluntary inpatient mental health admission.  I had from the patient approximately 2 PM.  He stated that he wished to be discharged.  He states he feels better after sleeping overnight and at this time denies suicidal ideation or thoughts of self-harm.  He does not have any additional concerns.  Patient does not appear intoxicated or chemically dependent.  He does not appear psychotic.  I do not feel the patient meets hold criteria at present.  Patient will be discharged.     Chris Almaraz MD  10/05/24 [id number]

## 2024-10-05 NOTE — CONSULTS
Diagnostic Evaluation Consultation  Crisis Assessment    Patient Name: Fran Lowery  Age:  43 year old  Legal Sex: male  Gender Identity: male  Pronouns:   Race: White  Ethnicity: Not  or   Language: English      Patient was assessed:     Crisis Assessment Start Date: 10/05/24  Crisis Assessment Start Time: 0525  Crisis Assessment Stop Time: 0535  Patient location: [facility] EMERGENCY DEPT                             ED22    Referral Data and Chief Complaint  Fran Lowery presents to the ED by  self (Pt discharged from ED and immediately approarched triage desk reporting that he was suicidal with plan to hang himself). Patient is presenting to the ED for the following concerns: Suicidal ideation, Substance use.   Factors that make the mental health crisis life threatening or complex are:  Pt presented to the ED on 9/4  for Substance use, Intoxication, Depression, Anxiety, Worsening psychosocial stress. He declined detox as was recommended. He denies SI at that time. Upon discharge from ED, Pt walked directly to triage desk and reported he was suicidal with plan of hangging himself..      Informed Consent and Assessment Methods  Explained the crisis assessment process, including applicable information disclosures and limits to confidentiality, assessed understanding of the process, and obtained consent to proceed with the assessment.  Assessment methods included conducting a formal interview with patient, review of medical records, collaboration with medical staff, and obtaining relevant collateral information from family and community providers when available.  : done     Patient response to interventions: acceptance expressed  Coping skills were attempted to reduce the crisis:        History of the Crisis   Per DEC consult 9/4/2025, Pt has historical diagnosis of Polysubstance Use Disorder, Bipolar 2 Disorder, Mood Disorder, Antisocial Personality Disorder, MDD, and  PTSD. Patient reports that today he was drinking prior to arrival. He reports that he last used methamphetamine yesterday morning. He has not taken his prescribed medications for two days because of his substance use.     Per chart review, patient is homeless. He reports that he is under commitment for CDMI. He has been working with his  to refer him to a substance use disorder treatment program. Pt declined detox.    Brief Psychosocial History  Family:  Single, Children no  Support System:  Other (specify) ( [employer])  Employment Status:  unemployed  Source of Income:     Financial Environmental Concerns:  unemployed, other (see comments) (homeless)  Current Hobbies:     Barriers in Personal Life:       Significant Clinical History  Current Anxiety Symptoms:   (none reported)  Current Depression/Trauma:  thoughts of death/suicide  Current Somatic Symptoms:   (none reoported)  Current Psychosis/Thought Disturbance:  hostile/aggressive (Pt was calm and cooperative with LMHP, but was verbally aggressive (yelling and swearing) towards other ED personnel inclluding Cristina KIRKPATRICK)  Current Eating Symptoms:   (none reported)  Chemical Use History:  Alcohol: Binge  Last Use:: 10/04/24  Benzodiazepines: None  Opiates: None  Cocaine: None  Marijuana: Daily  Last Use:: 10/04/24  Other Use: Methamphetamines  Last Use:: 10/03/24   Past diagnosis:  Anxiety Disorder, Depression, Personality Disorder, Substance Use Disorder, PTSD  Family history:  No known history of mental health or chemical health concerns  Past treatment:  Case management, Civil Commitment, Probation/Court ordered treatment, Inpatient Hospitalization, Supportive Living Environment (group home, prison house, etc)  Details of most recent treatment:  Currently on MICD commitment through [facility],  Other relevant history:  Pt is not engaged in any recent treatment. He reports CM is working with him to get SA treatment. Pt declined detox  and SA referrals in ED       Collateral Information  Is there collateral information: No (messages left for mother and cm)     Collateral information name, relationship, phone number:       What happened today:       What is different about patient's functioning:       Concern about alcohol/drug use:      What do you think the patient needs:      Has patient made comments about wanting to kill themselves/others:      If d/c is recommended, can they take part in safety/aftercare planning:       Additional collateral information:        Risk Assessment  McCulloch Suicide Severity Rating Scale Full Clinical Version:  Suicidal Ideation  Q6 Suicide Behavior (Lifetime): no          McCulloch Suicide Severity Rating Scale Recent:   Suicidal Ideation (Recent)  Q1 Wished to be Dead (Past Month): yes  Q2 Suicidal Thoughts (Past Month): yes  Q3 Suicidal Thought Method: yes  Q4 Suicidal Intent without Specific Plan: no  Q5 Suicide Intent with Specific Plan: yes  If yes to Q6, within past 3 months?: yes  Level of Risk per Screen: high risk     Suicidal Behavior (Recent)  Actual Attempt (Past 3 Months): No  Preparatory Acts or Behavior (Past 3 Months): No    Environmental or Psychosocial Events: unstable housing, homelessness, ongoing abuse of substances, neither working nor attending school  Protective Factors: Protective Factors: other (see comment) (knowledgeable of community resources to get needs met)    Does the patient have thoughts of harming others? Feels Like Hurting Others: no  Previous Attempt to Hurt Others: no  Is the patient engaging in sexually inappropriate behavior?: no    Is the patient engaging in sexually inappropriate behavior?  no        Mental Status Exam   Affect:    Appearance:    Attention Span/Concentration:    Eye Contact:      Fund of Knowledge:     Language /Speech Content:    Language /Speech Volume:    Language /Speech Rate/Productions:    Recent Memory:    Remote Memory:    Mood:    Orientation to  Person:     Orientation to Place:    Orientation to Time of Day:    Orientation to Date:       Situation (Do they understand why they are here?):    Psychomotor Behavior:    Thought Content:    Thought Form:       Mini-Cog Assessment  Number of Words Recalled:    Clock-Drawing Test:     Three Item Recall:    Mini-Cog Total Score:       Medication  Psychotropic medications:   Medication Orders - Psychiatric (From admission, onward)      None             Current Care Team  Patient Care Team:  No Ref-Primary, Physician as PCP - General    Diagnosis  Patient Active Problem List   Diagnosis Code    CARDIOVASCULAR SCREENING; LDL GOAL LESS THAN 160 Z13.6    Suicidal ideation R45.851    Bipolar 2 disorder (H) F31.81    Alcohol dependence in remission (H) F10.21    History of hip fracture Z87.81    Marijuana use F12.90    Substance induced mood disorder (H) F19.94    Bipolar affective disorder, current episode manic, current episode severity unspecified (H) F31.10    Mood disorder (H) F39    Stimulant use disorder F15.90    Alcohol use disorder, moderate, dependence (H) F10.20    Anxiety F41.9    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 This time, he reported he was suicidal with plan to hang himself. He yelled anf swore at MD. He was calm during DEC assessment, although minimally responsive. He was unable/unwilling to contract for safety with MD or LM. While it appears Pt may be requesting admission for secondary gain, his history of substance abuse and chronic SI put him at risk. Of note is that Pt is  now reporting a plan and is unable to contract for safety. He has been added to the work list for IP admission. Pt has historically wanted to discharge prior to placement. If Pt does want to discharge, he will be reassessed by DEC       Imminent risk of harm: Suicidal Behavior  Severe psychiatric, behavioral or other comorbid conditions are appropriate for management at inpatient mental health as indicated by at least one of the following: Psychiatric Symptoms, Comorbid substance use disorder, Symptoms of impact to function, Impaired impulse control, judgement, or insight  Severe dysfunction in daily living is present as indicated by at least one of the following: Complete inability to maintain any appropriate aspect of personal responsibility in any adult roles, Other evidence of severe dysfunction  Situation and expectations are appropriate for inpatient care: Voluntary treatment at lower level of care is not feasible, Patient management/treatment at lower level of care is not feasible or is inappropriate, Biopsychosocial stresses potentially contributing to clinical presentation (co morbidities) have been assessed and are absent or manageable at proposed level of care  Inpatient mental health services are necessary to meet patient needs and at least one of the following: Specific condition related to admission diagnosis is present and judged likely to further improve at proposed level of care, Specific condition related to admission diagnosis is present and judged likely to deteriorate in absence of treatment at proposed level of care      Patient coping skills attempted to reduce the crisis:       Disposition  Recommended disposition: Inpatient Mental Health        Reviewed case and recommendations with attending provider. Attending Name: Dr Ludmila Moss       Attending concurs with disposition: yes       Patient and/or validated legal guardian concurs with disposition:   yes       Final disposition:       Legal  status on admission: Voluntary/Patient has signed consent for treatment, Commitment    Assessment Details   Total duration spent with the patient:       CPT code(s) utilized:      ANGELA Mckeon, Psychotherapist  DEC - Triage & Transition Services  Callback: [phone]

## 2024-10-05 NOTE — ED NOTES
Pt brought from triage into room 22. Pt states he feels suicidal with a plan to kill himself with a string from his pants. Pt also states he feels like he could use the items in the room to hang himself. Room cleared, pt informed he will be on camera, changed into mental health scrubs, given a warm a blanket and informed him that the cameras in the room will be monitoring him. Back door locked, pt is not on hold and able to leave the room via front door by nursing station.     Glass of water given to patient and currently resting in bed.

## 2024-10-06 ASSESSMENT — ACTIVITIES OF DAILY LIVING (ADL)
ADLS_ACUITY_SCORE: 38

## 2024-10-06 NOTE — ED TRIAGE NOTES
Pt arrives from [facility] with [facility]  ems he was having suicidal ideations he has had these thought in the past and they have gotten stronger his plan is to hang himself.  He states as he gets more and more depressed the thoughts get deeper and deeper and he thinks of all the ways to harm himself.  Pt was seen recently at [facility] but discharged home.  Pt states that he did take his meds today but not the two days before. Security searched pt pt belongings secured in DEC office in bin 5 and pt was changed to cinnamon scrubs.       Triage Assessment (Adult)       Row Name 10/05/24 3165          Triage Assessment    Airway WDL WDL        Respiratory WDL    Respiratory WDL WDL        Skin Circulation/Temperature WDL    Skin Circulation/Temperature WDL WDL        Cardiac WDL    Cardiac WDL WDL        Peripheral/Neurovascular WDL    Peripheral Neurovascular WDL WDL        Cognitive/Neuro/Behavioral WDL    Cognitive/Neuro/Behavioral WDL WDL

## 2024-10-06 NOTE — ED PROVIDER NOTES
Emergency Department Note      History of Present Illness     Chief Complaint   Suicidal      HPI   Fran Lowery is a 43 year old male who presents emerged department for suicidal ideation.  Patient reports that he is given happy.  He reports that he walks a lot when his mental health is not declined.  He denies any recent suicide attempts.  Denies any ingestion or overdose.  Denies any trauma.  Does report drinking some alcohol today.  He is calm and cooperative otherwise.    Independent Historian   None    Review of External Notes   MIIC tetanus 2019.  Reviewed lab assessment from ER evaluation 10/4/2024 as well as discharge from [facility] emergency department from 10/05/2024.  Patient was also evaluated [facility] on 10/3/2024 and discharged with suicidal ideation.    Past Medical History     Medical History and Problem List   No past medical history on file.    Medications   escitalopram (LEXAPRO) 20 MG tablet  lamoTRIgine (LAMICTAL) 150 MG tablet        Surgical History   Past Surgical History:   Procedure Laterality Date    ZZ NONSPECIFIC PROCEDURE  1999    repair of torn tendon in rt wrist       Physical Exam     Patient Vitals for the past 24 hrs:   BP Temp Temp src Pulse Resp SpO2   10/05/24 1949 -- 98.2  F (36.8  C) Oral -- -- --   10/05/24 1908 (!) 166/72 -- -- 108 22 95 %     Physical Exam  General: Resting on the bed.  Head: No obvious trauma to head.  Ears, Nose, Throat:  External ears normal.  Nose normal.    Eyes:  Conjunctivae clear.  Pupils are equal, round, and reactive.   Neck: Normal range of motion.  Neck supple.   CV: Regular rate and rhythm.  No murmurs.      Respiratory: Effort normal and breath sounds normal.  No wheezing or crackles.   Gastrointestinal: Soft.  No distension. There is no tenderness.    Neuro: Alert. Moving all extremities appropriately.  Normal speech.    Skin: Skin is warm and dry.  No rash noted.  Blisters noted to the heel of the left foot as well as the plantar aspect  of the right foot between the first and second toe.  Psych: Normal mood and affect. Behavior is normal.  Calm and cooperative.  Reports suicidal ideation without current thoughts of hurting self in the emergency department.  Reports passive homicidal ideation.  Denies any hallucinations.      Diagnostics     Lab Results   Labs Ordered and Resulted from Time of ED Arrival to Time of ED Departure - No data to display    Imaging   No orders to display       Independent Interpretation   None    ED Course      Medications Administered   Medications   OLANZapine zydis (zyPREXA) ODT tab 10 mg (has no administration in time range)   escitalopram (LEXAPRO) tablet 20 mg (has no administration in time range)   lamoTRIgine (LaMICtal) tablet 300 mg (has no administration in time range)       Procedures   Procedures     Discussion of Management   ED Mental Health, awaiting consult    ED Course        Additional Documentation  None    Medical Decision Making / Diagnosis     CMS Diagnoses: None    MIPS       None    MDM   Fran Lowery is a 43 year old male who presents to the emergency department with suicidal ideation.  Vital signs are stable.  Broad differential was considered including not limited to toxic alcohols, overdose, decompensated mental health, medication noncompliance, trauma, etc.  Patient is well-appearing nontoxic.  He is calm and cooperative here.  His alcohol level is 0.44.  He appears to be clinically sober.  He endorses ongoing suicidal ideation.  He denies any attempts recently denies any toxic alcohols, overdose etc.  It appears that he is intermittently compliant with his medication.  Patient has been seen several times for his ongoing suicidal ideation.  Patient was signed out to my partner pending DEC assessment for final disposition.    Disposition   The patient was signed out to Dr Haley pending DEC     Diagnosis     ICD-10-CM    1. Suicidal ideation  R45.851            Discharge Medications    New Prescriptions    No medications on file         MD Devendra Jeffrey Jennifer L, MD  10/06/24 0001

## 2024-10-06 NOTE — CONSULTS
"Diagnostic Evaluation Consultation  Crisis Assessment    Patient Name: Fran FITZGERALD \"Alfie\" Sebastián  Age:  43 year old  Legal Sex: male  Gender Identity: male  Pronouns: He/him/his  Race: White  Ethnicity: Not  or   Language: English      Patient was assessed:     Crisis Assessment Start Date: 10/06/24  Crisis Assessment Start Time: 0120  Crisis Assessment Stop Time: 0132  Patient location: [facility] EMERGENCY DEPT                             ED05    Referral Data and Chief Complaint  Fran Lowery presents to the ED via EMS. Patient is presenting to the ED for the following concerns: Suicidal ideation, Substance use.   Factors that make the mental health crisis life threatening or complex are:  Patient has been feeling down for a couple of weeks. He missed doses of medication intermittently.  He stated there were four times when he got really close to attempting suicide.  His plan tonight would be to hang himself. Patient went to the ED two days ago but wasn't admitted. He believes he needs inpatient level of care to \"re-regulate\".  Patient has had mental health assessments each of the last three days; 10/3/2024, 10/4/2024, and 10/5/2024.  He requested psychiatric admission which was declined. On this admission, he states a suicide plan to hang himself.  Patient is currently on a health officer hold. He denies homicidal thoughts or hallucinations. Patient reports having a few alcoholic beverages today.      Informed Consent and Assessment Methods  Explained the crisis assessment process, including applicable information disclosures and limits to confidentiality, assessed understanding of the process, and obtained consent to proceed with the assessment.  Assessment methods included conducting a formal interview with patient, review of medical records, collaboration with medical staff, and obtaining relevant collateral information from family and community providers when " available.  : Done     Patient response to interventions:    Coping skills were attempted to reduce the crisis:        History of the Crisis   Previous diagnoses include ADHD, Bipolar II Disorder, PTSD, Antisocial Personality Disorder, and Substance Use Disorder.  Patient's  is Shahrzad Voss, [facility], [phone]. Patient does not have a psychiatrist or therapist.  He has not been in a group home.  Last inpatient was one month ago at [facility].  Patient is under an MI/CD re-commitment to the Commissioner of [facility] by [facility] through 3/16/2025.    Brief Psychosocial History  Family:  Single, Children no  Support System:  Parent(s)  Employment Status:  unemployed  Source of Income:  public assistance (Patient receives General Assistance)  Financial Environmental Concerns:  unable to afford rent/mortgage, unemployed  Current Hobbies:  music, outdoor activities  Barriers in Personal Life:  mental health concerns    Significant Clinical History  Current Anxiety Symptoms:  Denied   Current Depression/Trauma:  thoughts of death/suicide, irritable  Current Somatic Symptoms:   Denied  Current Psychosis/Thought Disturbance:     Current Eating Symptoms:  Denied     Chemical Use History:    Alcohol: Binge  Last Use:: 10/05/24  Benzodiazepines: None  Opiates: None  Cocaine: None  Marijuana: Daily  Last Use:: 10/05/24  Other Use: Methamphetamines  Last Use:: 10/03/24     Past diagnosis:  ADHD, Bipolar II Disorder, Antisocial Personality Disorder, PTSD, Substance Use Disorder  Family history:  No known history of mental health or chemical health concerns  Past treatment:  Psychiatric Medication Management, Supportive Living Environment (group home, [facility] house, etc), Case management, Civil Commitment, Primary Care, Inpatient Hospitalization  Details of most recent treatment:  Patient is under an MI/CD re-commitment to the Commissioner of [facility] by [facility] through 3/16/2025.  Other relevant history:   Patient has an [facility] . He denies having a psychiatrist or therapist.       Collateral Information  Is there collateral information: No (Writer attempted to call patient's mother, Norma Schilling, [phone]. There was no answer.)     Collateral information name, relationship, phone number:       What happened today:       What is different about patient's functioning:       Concern about alcohol/drug use:      What do you think the patient needs:      Has patient made comments about wanting to kill themselves/others:      If d/c is recommended, can they take part in safety/aftercare planning:       Additional collateral information:        Risk Assessment  [place] Suicide Severity Rating Scale Full Clinical Version: 10/4/2024  Suicidal Ideation  Q6 Suicide Behavior (Lifetime): yes     [place] Suicide Severity Rating Scale Recent:  10/5/2024  Suicidal Ideation (Recent)  Q1 Wished to be Dead (Past Month): yes  Q2 Suicidal Thoughts (Past Month): yes  Q3 Suicidal Thought Method: yes (Hanging)  Q4 Suicidal Intent without Specific Plan: no  Q5 Suicide Intent with Specific Plan: no  If yes to Q6, within past 3 months?: yes  Level of Risk per Screen: high risk     Suicidal Behavior (Recent)  Actual Attempt (Past 3 Months): No  Has subject engaged in non-suicidal self-injurious behavior? (Past 3 Months): No  Interrupted Attempts (Past 3 Months): No  Aborted or Self-Interrupted Attempt (Past 3 Months): No  Preparatory Acts or Behavior (Past 3 Months): No    Environmental or Psychosocial Events: unemployment/underemployment, unstable housing, homelessness, ongoing abuse of substances, other life stressors  Protective Factors: Protective Factors: other (see comment), sense of self-efficacy and/or positive self-esteem (Knows area behavioral health resources and services.)    Does the patient have thoughts of harming others? Feels Like Hurting Others: no  Previous Attempt to Hurt Others: no  Is the patient  engaging in sexually inappropriate behavior?: no    Is the patient engaging in sexually inappropriate behavior?  no        Mental Status Exam   Affect: Flat  Appearance: Appropriate  Attention Span/Concentration: Attentive  Eye Contact: Engaged    Fund of Knowledge: Appropriate   Language /Speech Content: Fluent  Language /Speech Volume: Normal  Language /Speech Rate/Productions: Minimally Responsive  Recent Memory: Intact  Remote Memory: Intact  Mood: Irritable  Orientation to Person: Yes   Orientation to Place: Yes  Orientation to Time of Day: Yes  Orientation to Date: Yes     Situation (Do they understand why they are here?): Yes  Psychomotor Behavior: Normal  Thought Content: Suicidal  Thought Form: Intact, Goal Directed        Medication  Psychotropic medications:   Medication Orders - Psychiatric (From admission, onward)      Start     Dose/Rate Route Frequency Ordered Stop    10/06/24 0800  escitalopram (LEXAPRO) tablet 20 mg         20 mg Oral DAILY 10/05/24 2228      10/05/24 2225  OLANZapine zydis (zyPREXA) ODT tab 10 mg         10 mg Oral 2 TIMES DAILY PRN 10/05/24 2226               Current Care Team  Patient Care Team:  No Ref-Primary, Physician as PCP - General    Diagnosis  Patient Active Problem List   Diagnosis Code    CARDIOVASCULAR SCREENING; LDL GOAL LESS THAN 160 Z13.6    Suicidal ideation R45.851    Bipolar 2 disorder (H) F31.81    Alcohol dependence in remission (H) F10.21    History of hip fracture Z87.81    Marijuana use F12.90    Substance induced mood disorder (H) F19.94    Bipolar affective disorder, current episode manic, current episode severity unspecified (H) F31.10    Mood disorder (H) F39    Stimulant use disorder F15.90    Alcohol use disorder, moderate, dependence (H) F10.20    Anxiety F41.9    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 He was scheduled for inpatient admission the morning of 10/5/2024, per his request, then requested discharge from the ED.  Patient returned to the ED a few hours later, after drinking alcohol, reporting suicidal thoughts of hanging himself.  He denies history of suicide attempts. Patient was living in a sober house two weeks ago, until he relapsed with alcohol and meth use. He has a  who is working on placement.  Patient's frequent ED visits without follow through in the community suggest that he may be looking for a place to stay, as much as mental health treatment.  He knows area resources and seeks help easily.  In consultation with Dr. Haley, it was determined that patient does not require an inpatient level of care at this time. He was given a safety plan.    Patient coping skills attempted to reduce the crisis:  Multiple ED visits.      Disposition  Recommended disposition:  Discharge        Reviewed case and recommendations with attending provider. Attending Name:  Dr. Haley      Attending concurs with disposition:  Yes       Patient and/or validated legal guardian concurs with disposition: Yes          Final disposition:   Discharge    Legal status on admission:  Voluntary    Assessment Details   Total duration spent with the patient: 12 min     CPT code(s) utilized: Non-Billable    Nuris Flores LP, Psychotherapist  [facility] - Triage & Transition Services  Callback: [phone]

## 2024-10-06 NOTE — ED NOTES
Bilateral blisters cleaned and wrapped. Pt given heating pad for lower back pain, effective per pt.

## 2024-10-07 ENCOUNTER — TELEPHONE (OUTPATIENT)
Dept: NURSING | Facility: CLINIC | Age: 44
End: 2024-10-07
Payer: COMMERCIAL

## 2024-10-07 LAB — LAMOTRIGINE SERPL-MCNC: <0.9 UG/ML

## 2024-10-07 NOTE — TELEPHONE ENCOUNTER
[facility]    Reason for call: Lab Result Notification     Lab Result (including Rx patient on, if applicable).  If culture, copy of lab report at bottom.  Lab Result:   Component      Latest Ref Rng 10/4/2024  8:58 PM   Lamotrigine      3.0 - 15.0 ug/mL <0.9 (L)       Legend:  (L) Low      Patient's current Symptoms:   Unable to reach via phone.  Invalid phone number.   No valid address and unable to send letter    RN Recommendations/Instructions per [place] ED lab result protocol:   [facility] ED lab result protocol utilized: misc    Sandeep Miller RN

## 2025-06-10 ENCOUNTER — TRANSFERRED RECORDS (OUTPATIENT)
Dept: HEALTH INFORMATION MANAGEMENT | Facility: CLINIC | Age: 45
End: 2025-06-10
Payer: COMMERCIAL

## 2025-06-11 ENCOUNTER — MEDICAL CORRESPONDENCE (OUTPATIENT)
Dept: HEALTH INFORMATION MANAGEMENT | Facility: CLINIC | Age: 45
End: 2025-06-11
Payer: COMMERCIAL